# Patient Record
Sex: FEMALE | Race: WHITE | NOT HISPANIC OR LATINO | Employment: UNEMPLOYED | URBAN - METROPOLITAN AREA
[De-identification: names, ages, dates, MRNs, and addresses within clinical notes are randomized per-mention and may not be internally consistent; named-entity substitution may affect disease eponyms.]

---

## 2022-04-07 ENCOUNTER — TELEPHONE (OUTPATIENT)
Dept: SURGERY | Facility: CLINIC | Age: 37
End: 2022-04-07
Payer: MEDICAID

## 2022-04-07 NOTE — TELEPHONE ENCOUNTER
----- Message from Jazmin Lyon sent at 4/7/2022 11:25 AM CDT -----  Regarding: appt  Contact: pt @ [phone]  Pt is calling to make appt, stated that the dr sent over referral. Asking for a call back

## 2022-04-07 NOTE — TELEPHONE ENCOUNTER
Left message I have not received the referral. Gave her the fax# to have her doctor refax it to us. Once I get it I will call her to schedule an appt with Dr Wilburn.

## 2022-05-23 ENCOUNTER — OFFICE VISIT (OUTPATIENT)
Dept: SURGERY | Facility: CLINIC | Age: 37
End: 2022-05-23
Payer: MEDICAID

## 2022-05-23 VITALS — HEART RATE: 77 BPM | WEIGHT: 134.25 LBS | SYSTOLIC BLOOD PRESSURE: 100 MMHG | DIASTOLIC BLOOD PRESSURE: 62 MMHG

## 2022-05-23 DIAGNOSIS — K62.3 RECTAL PROLAPSE: Primary | ICD-10-CM

## 2022-05-23 DIAGNOSIS — R15.9 FULL INCONTINENCE OF FECES: ICD-10-CM

## 2022-05-23 PROCEDURE — 3078F PR MOST RECENT DIASTOLIC BLOOD PRESSURE < 80 MM HG: ICD-10-PCS | Mod: CPTII,,, | Performed by: COLON & RECTAL SURGERY

## 2022-05-23 PROCEDURE — 99213 OFFICE O/P EST LOW 20 MIN: CPT | Mod: PBBFAC,PN | Performed by: COLON & RECTAL SURGERY

## 2022-05-23 PROCEDURE — 1159F MED LIST DOCD IN RCRD: CPT | Mod: CPTII,,, | Performed by: COLON & RECTAL SURGERY

## 2022-05-23 PROCEDURE — 3074F PR MOST RECENT SYSTOLIC BLOOD PRESSURE < 130 MM HG: ICD-10-PCS | Mod: CPTII,,, | Performed by: COLON & RECTAL SURGERY

## 2022-05-23 PROCEDURE — 3078F DIAST BP <80 MM HG: CPT | Mod: CPTII,,, | Performed by: COLON & RECTAL SURGERY

## 2022-05-23 PROCEDURE — 99999 PR PBB SHADOW E&M-EST. PATIENT-LVL III: CPT | Mod: PBBFAC,,, | Performed by: COLON & RECTAL SURGERY

## 2022-05-23 PROCEDURE — 99999 PR PBB SHADOW E&M-EST. PATIENT-LVL III: ICD-10-PCS | Mod: PBBFAC,,, | Performed by: COLON & RECTAL SURGERY

## 2022-05-23 PROCEDURE — 99205 PR OFFICE/OUTPT VISIT, NEW, LEVL V, 60-74 MIN: ICD-10-PCS | Mod: S$PBB,,, | Performed by: COLON & RECTAL SURGERY

## 2022-05-23 PROCEDURE — 99205 OFFICE O/P NEW HI 60 MIN: CPT | Mod: S$PBB,,, | Performed by: COLON & RECTAL SURGERY

## 2022-05-23 PROCEDURE — 3074F SYST BP LT 130 MM HG: CPT | Mod: CPTII,,, | Performed by: COLON & RECTAL SURGERY

## 2022-05-23 PROCEDURE — 1159F PR MEDICATION LIST DOCUMENTED IN MEDICAL RECORD: ICD-10-PCS | Mod: CPTII,,, | Performed by: COLON & RECTAL SURGERY

## 2022-05-23 RX ORDER — OXCARBAZEPINE 150 MG/1
150 TABLET, FILM COATED ORAL 2 TIMES DAILY
COMMUNITY
Start: 2021-12-16

## 2022-05-23 RX ORDER — TRAZODONE HYDROCHLORIDE 100 MG/1
100 TABLET ORAL
COMMUNITY
Start: 2022-05-17

## 2022-05-23 RX ORDER — OXYCODONE AND ACETAMINOPHEN 10; 325 MG/1; MG/1
TABLET ORAL
COMMUNITY
Start: 2022-02-28

## 2022-05-23 RX ORDER — PANCRELIPASE 24000; 76000; 120000 [USP'U]/1; [USP'U]/1; [USP'U]/1
2 CAPSULE, DELAYED RELEASE PELLETS ORAL 3 TIMES DAILY
COMMUNITY
Start: 2022-04-19

## 2022-05-23 RX ORDER — TIZANIDINE 4 MG/1
4 TABLET ORAL
COMMUNITY
Start: 2022-05-17

## 2022-05-23 RX ORDER — CARIPRAZINE 3 MG/1
CAPSULE, GELATIN COATED ORAL
COMMUNITY
Start: 2022-05-17

## 2022-05-23 RX ORDER — INDOMETHACIN 50 MG/1
50 CAPSULE ORAL
COMMUNITY
Start: 2022-02-28 | End: 2022-12-01 | Stop reason: CLARIF

## 2022-05-23 RX ORDER — PREDNISONE 20 MG/1
20 TABLET ORAL
COMMUNITY
Start: 2022-02-28 | End: 2022-12-01 | Stop reason: CLARIF

## 2022-05-23 RX ORDER — PANTOPRAZOLE SODIUM 40 MG/1
40 TABLET, DELAYED RELEASE ORAL
COMMUNITY
Start: 2022-03-19

## 2022-05-23 NOTE — H&P (VIEW-ONLY)
CRS Office Visit History and Physical    Referring Md:   Cornelio Zheng Md  [address] [place] [address] [place] [id number]    SUBJECTIVE:     Chief Complaint: rectal prolapse    History of Present Illness:  Patient is a 37 y.o. female presents with recurrent rectal prolapse.     8-2017 robotic rectopexy   robotic resection rectopexy    Recurrent protrusion.   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  On Creon.  NOT on questran.  Sometimes has fecal incontinence.  Wears diaper.          History reviewed. No pertinent past medical history.    No past surgical history on file.    Review of patient's allergies indicates:   Allergen Reactions    Cephalosporins Anaphylaxis     Other reaction(s): Infection  Pt can take penicillin      Metoclopramide Other (See Comments)     Other reaction(s): Infection  Involuntary muscle spasms         Current Outpatient Medications on File Prior to Visit   Medication Sig Dispense Refill    cariprazine (VRAYLAR) 3 mg Cap   TAKE ONE CAPSULE BY MOUTH EVERY DAY THANK YOU      CREON 24,000-76,000 -120,000 unit capsule Take 2 capsules by mouth 3 (three) times daily.      indomethacin (INDOCIN) 50 MG capsule 50 mg.      OXcarbazepine (TRILEPTAL) 150 MG Tab Take 150 mg by mouth 2 (two) times daily.      oxyCODONE-acetaminophen (PERCOCET)  mg per tablet   1 tab, Oral, q6h, PRN for pain, 0 Refill(s)      pantoprazole (PROTONIX) 40 MG tablet 40 mg.      predniSONE (DELTASONE) 20 MG tablet 20 mg.      tiZANidine (ZANAFLEX) 4 MG tablet 4 mg.      traZODone (DESYREL) 100 MG tablet 100 mg.      UNABLE TO FIND 325 mg.       No current facility-administered medications on file prior to visit.       History reviewed. No pertinent family history.          Review of Systems:  ROS:  GENERAL: No fever, chills, fatigability or weight loss.  Integument:  No rashes, redness, icterus  CHEST: Denies ORTIZ, cyanosis, wheezing, cough and sputum production.  CARDIOVASCULAR: Denies chest  pain, PND, orthopnea or reduced exercise tolerance.  GI:  Denies abd pain, dysphagia, nausea, vomiting, no hematemesis, no rectal pain  : Denies burning on urination, no hematuria, no bacteriuria  MSK:  No deformities, swelling, joint pain swelling  Neurologic:  No HAs, seizures, weakness, paresthesias, gait problems      OBJECTIVE:     Vital Signs (Most Recent)  /62 (BP Location: Left arm, Patient Position: Sitting, BP Method: Large (Automatic))   Pulse 77   Wt 60.9 kg (134 lb 4.2 oz)     Physical Exam:  General: White female in no distress   Skin/ Sclera anicteric  HEENT: anicteric, normocephalic, extraocular movements intact   Neck trachea midline, thyroid not enlarged  Chest symmetric, nl excursions, no retractions  Respiratory: respirations are even and unlabored  COR RRR   Abdomen - inspection       Healed incisions - upper midline, Pfannenstiel  soft NT ND.  no masses, no  Organomegaly    Anorectal:   Inspection         RUY:  decreased tone, no masses, weak but present pelvic floor movement, little external sphincter squeeze, nl relaxation with Valsalva  Extremities: Warm dry and intact.  NO CCE  Neuro: alert and oriented x 4.  Moves all extremities.         ASSESSMENT/PLAN:   Recurrent rectal prolapse  Chronic diarrhea  Small umbilical hernia      Recommend  Begin questran continue Creon for chronic loose stools  Open rectopexy with mesh.  The details of procedure, need for mesh repair given multiple recurrences, possible constipation after surgery and possible continued fecal incontinence were discussed explicitly.  The risks related to chronic mesh including infection, erosion and need for removal of the mesh were discussed in detail.  Additional risks of bleeding, need for blood transfusion, infection, need for reoperation and possible recurrence of the prolapse were discussed.  Expected hospitalization and recuperation were explained.

## 2022-05-23 NOTE — PROGRESS NOTES
CRS Office Visit History and Physical    Referring Md:   Cornelio Zheng Md  [address] [place] [address] [place] [id number]    SUBJECTIVE:     Chief Complaint: rectal prolapse    History of Present Illness:  Patient is a 37 y.o. female presents with recurrent rectal prolapse.     8-2017 robotic rectopexy   robotic resection rectopexy    Recurrent protrusion.   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  On Creon.  NOT on questran.  Sometimes has fecal incontinence.  Wears diaper.          History reviewed. No pertinent past medical history.    No past surgical history on file.    Review of patient's allergies indicates:   Allergen Reactions    Cephalosporins Anaphylaxis     Other reaction(s): Infection  Pt can take penicillin      Metoclopramide Other (See Comments)     Other reaction(s): Infection  Involuntary muscle spasms         Current Outpatient Medications on File Prior to Visit   Medication Sig Dispense Refill    cariprazine (VRAYLAR) 3 mg Cap   TAKE ONE CAPSULE BY MOUTH EVERY DAY THANK YOU      CREON 24,000-76,000 -120,000 unit capsule Take 2 capsules by mouth 3 (three) times daily.      indomethacin (INDOCIN) 50 MG capsule 50 mg.      OXcarbazepine (TRILEPTAL) 150 MG Tab Take 150 mg by mouth 2 (two) times daily.      oxyCODONE-acetaminophen (PERCOCET)  mg per tablet   1 tab, Oral, q6h, PRN for pain, 0 Refill(s)      pantoprazole (PROTONIX) 40 MG tablet 40 mg.      predniSONE (DELTASONE) 20 MG tablet 20 mg.      tiZANidine (ZANAFLEX) 4 MG tablet 4 mg.      traZODone (DESYREL) 100 MG tablet 100 mg.      UNABLE TO FIND 325 mg.       No current facility-administered medications on file prior to visit.       History reviewed. No pertinent family history.          Review of Systems:  ROS:  GENERAL: No fever, chills, fatigability or weight loss.  Integument:  No rashes, redness, icterus  CHEST: Denies ORTIZ, cyanosis, wheezing, cough and sputum production.  CARDIOVASCULAR: Denies chest  pain, PND, orthopnea or reduced exercise tolerance.  GI:  Denies abd pain, dysphagia, nausea, vomiting, no hematemesis, no rectal pain  : Denies burning on urination, no hematuria, no bacteriuria  MSK:  No deformities, swelling, joint pain swelling  Neurologic:  No HAs, seizures, weakness, paresthesias, gait problems      OBJECTIVE:     Vital Signs (Most Recent)  /62 (BP Location: Left arm, Patient Position: Sitting, BP Method: Large (Automatic))   Pulse 77   Wt 60.9 kg (134 lb 4.2 oz)     Physical Exam:  General: White female in no distress   Skin/ Sclera anicteric  HEENT: anicteric, normocephalic, extraocular movements intact   Neck trachea midline, thyroid not enlarged  Chest symmetric, nl excursions, no retractions  Respiratory: respirations are even and unlabored  COR RRR   Abdomen - inspection       Healed incisions - upper midline, Pfannenstiel  soft NT ND.  no masses, no  Organomegaly    Anorectal:   Inspection         RUY:  decreased tone, no masses, weak but present pelvic floor movement, little external sphincter squeeze, nl relaxation with Valsalva  Extremities: Warm dry and intact.  NO CCE  Neuro: alert and oriented x 4.  Moves all extremities.         ASSESSMENT/PLAN:   Recurrent rectal prolapse  Chronic diarrhea  Small umbilical hernia      Recommend  Begin questran continue Creon for chronic loose stools  Open rectopexy with mesh.  The details of procedure, need for mesh repair given multiple recurrences, possible constipation after surgery and possible continued fecal incontinence were discussed explicitly.  The risks related to chronic mesh including infection, erosion and need for removal of the mesh were discussed in detail.  Additional risks of bleeding, need for blood transfusion, infection, need for reoperation and possible recurrence of the prolapse were discussed.  Expected hospitalization and recuperation were explained.

## 2022-05-26 DIAGNOSIS — K62.3 RECTAL PROLAPSE: Primary | ICD-10-CM

## 2022-06-13 ENCOUNTER — TELEPHONE (OUTPATIENT)
Dept: SURGERY | Facility: CLINIC | Age: 37
End: 2022-06-13
Payer: MEDICAID

## 2022-06-13 DIAGNOSIS — Z01.818 PREOP TESTING: Primary | ICD-10-CM

## 2022-06-13 RX ORDER — METRONIDAZOLE 500 MG/1
500 TABLET ORAL 3 TIMES DAILY
Qty: 3 TABLET | Refills: 0 | Status: SHIPPED | OUTPATIENT
Start: 2022-06-13

## 2022-06-13 RX ORDER — POLYETHYLENE GLYCOL 3350, SODIUM SULFATE ANHYDROUS, SODIUM BICARBONATE, SODIUM CHLORIDE, POTASSIUM CHLORIDE 236; 22.74; 6.74; 5.86; 2.97 G/4L; G/4L; G/4L; G/4L; G/4L
4 POWDER, FOR SOLUTION ORAL ONCE
Qty: 4000 ML | Refills: 0 | Status: SHIPPED | OUTPATIENT
Start: 2022-06-13 | End: 2022-06-13

## 2022-06-13 RX ORDER — NEOMYCIN SULFATE 500 MG/1
TABLET ORAL
Qty: 6 TABLET | Refills: 0 | Status: SHIPPED | OUTPATIENT
Start: 2022-06-13 | End: 2022-12-01 | Stop reason: CLARIF

## 2022-06-13 NOTE — TELEPHONE ENCOUNTER
----- Message from Paige Arcenio sent at 6/13/2022 11:37 AM CDT -----  Regarding: pt advice  Contact: pt @ [phone]  Pt calling to speak with someone in Dr. Wilburn' office regarding getting the orders sent over for her covid test, and needs to discuss medication that the doctor was to have prescribed for her. Please call.    [facility], [facility] - [place], [place] - [address]  [address] [facility] [address]  Phone: [phone] Fax: [phone]

## 2022-06-15 ENCOUNTER — TELEPHONE (OUTPATIENT)
Dept: SURGERY | Facility: CLINIC | Age: 37
End: 2022-06-15
Payer: MEDICAID

## 2022-06-15 NOTE — TELEPHONE ENCOUNTER
----- Message from Aimee Cheung sent at 6/15/2022  1:11 PM CDT -----  Contact: [facility]    Kelly from [facility] pharmacy advising office that RX below is on back order and she requesting a new script for Hunter bowel kit    Go Lightly    Confirmed contact below:  Contact Name:Kelly  Phone Number: [phone]

## 2022-06-16 ENCOUNTER — ANESTHESIA EVENT (OUTPATIENT)
Dept: SURGERY | Facility: HOSPITAL | Age: 37
End: 2022-06-16
Payer: MEDICAID

## 2022-06-16 NOTE — ANESTHESIA PREPROCEDURE EVALUATION
[facility]  Anesthesia Pre-Operative Evaluation         Patient Name/: Sheila Ortez, 1985  MRN: [id number]    SUBJECTIVE:     Pre-operative evaluation for Procedure(s) (LRB):  RECTOPEXY, LAPAROSCOPIC with Mesh (N/A)     2022    Sheila Ortez is a 37 y.o. female w/ a significant PMHx of anemia, current smoker, anxiety, dumping syndrome, fibromyalgia, GERD, levoscoliosis, chronic opioid user (fills 90 Norco 10 monthly per PDMP), and recurrent rectal prolapse s/p 2 prior rectopexy.     Patient now presents for the above procedure(s).    Prev airway: None documented.      There is no problem list on file for this patient.      Review of patient's allergies indicates:   Allergen Reactions    Cephalosporins Anaphylaxis     Other reaction(s): Infection  Pt can take penicillin      Metoclopramide Other (See Comments)     Other reaction(s): Infection  Involuntary muscle spasms         Current Inpatient Medications:       No current facility-administered medications on file prior to encounter.     Current Outpatient Medications on File Prior to Encounter   Medication Sig Dispense Refill    cariprazine (VRAYLAR) 3 mg Cap   TAKE ONE CAPSULE BY MOUTH EVERY DAY THANK YOU      CREON 24,000-76,000 -120,000 unit capsule Take 2 capsules by mouth 3 (three) times daily.      indomethacin (INDOCIN) 50 MG capsule 50 mg.      OXcarbazepine (TRILEPTAL) 150 MG Tab Take 150 mg by mouth 2 (two) times daily.      oxyCODONE-acetaminophen (PERCOCET)  mg per tablet   1 tab, Oral, q6h, PRN for pain, 0 Refill(s)      pantoprazole (PROTONIX) 40 MG tablet 40 mg.      predniSONE (DELTASONE) 20 MG tablet 20 mg.      tiZANidine (ZANAFLEX) 4 MG tablet 4 mg.      traZODone (DESYREL) 100 MG tablet 100 mg.      UNABLE TO FIND 325 mg.         Past Surgical History:   Procedure Laterality Date    ABDOMINAL SURGERY      COLON SURGERY         Social History:  Tobacco Use: High Risk    Smoking Tobacco Use:  Current Every Day Smoker    Smokeless Tobacco Use: Unknown       Alcohol Use: Not on file       OBJECTIVE:     Vital Signs Range:  BMI Readings from Last 1 Encounters:   No data found for BMI               Significant Labs:        Component Value Date/Time    WBC 10.8 (H) 05/17/2022 1057    HGB 8.9 (L) 05/17/2022 1057    HCT 29.3 (L) 05/17/2022 1057     (H) 05/17/2022 1057     03/19/2022 0448    K 4.0 03/19/2022 0448     (H) 03/19/2022 0448    CO2 26 03/19/2022 0448    BUN 7 (L) 03/19/2022 0448    CREATININE 0.41 (L) 03/19/2022 0448    CALCIUM 8.0 (L) 03/19/2022 0448    ALBUMIN 3.2 03/17/2022 1450    ALKPHOS 124 (H) 03/17/2022 1450    AST 14 03/17/2022 1450    ALT 41 03/17/2022 1450        Please see Results Review for additional labs.     Diagnostic Studies: No relevant studies.    EKG:   No results found for this or any previous visit.    ECHO:  No results found for this or any previous visit.        ASSESSMENT/PLAN:       Pre-op Assessment    I have reviewed the Patient Summary Reports.     I have reviewed the Nursing Notes.    I have reviewed the Medications.     Review of Systems  Social:  Smoker    EENT/Dental:EENT/Dental Normal   Cardiovascular:  Cardiovascular Normal     Pulmonary:  Pulmonary Normal    Renal/:  Renal/ Normal     Hepatic/GI:   GERD    Neurological:  Neurology Normal    Psych:   Psychiatric History anxiety          Physical Exam  General: Well nourished, Cooperative, Alert and Oriented    Airway:  Mallampati: II / I  Mouth Opening: Normal  Neck ROM: Normal ROM    Dental:  Intact    Chest/Lungs:  Clear to auscultation, Normal Respiratory Rate    Heart:  Rate: Normal  Rhythm: Regular Rhythm  Sounds: Normal        Anesthesia Plan  Type of Anesthesia, risks & benefits discussed:    Anesthesia Type: Gen ETT  Intra-op Monitoring Plan: Standard ASA Monitors  Post Op Pain Control Plan: multimodal analgesia and IV/PO Opioids PRN  Induction:  IV  Airway Plan: Direct,  Post-Induction  Informed Consent: Informed consent signed with the Patient and all parties understand the risks and agree with anesthesia plan.  All questions answered.   ASA Score: 2  Day of Surgery Review of History & Physical: H&P Update referred to the surgeon/provider.  Anesthesia Plan Notes: Per PDMP review, patient does regularly fill 90 Norco 10s every month.     Ready For Surgery From Anesthesia Perspective.     .

## 2022-06-17 ENCOUNTER — HOSPITAL ENCOUNTER (INPATIENT)
Facility: HOSPITAL | Age: 37
LOS: 1 days | Discharge: HOME | End: 2022-06-18
Attending: COLON & RECTAL SURGERY | Admitting: COLON & RECTAL SURGERY
Payer: MEDICAID

## 2022-06-17 ENCOUNTER — ANESTHESIA (OUTPATIENT)
Dept: SURGERY | Facility: HOSPITAL | Age: 37
End: 2022-06-17
Payer: MEDICAID

## 2022-06-17 DIAGNOSIS — K62.3 RECTAL PROLAPSE: ICD-10-CM

## 2022-06-17 LAB
ABO + RH BLD: NORMAL
ANISOCYTOSIS BLD QL SMEAR: SLIGHT
B-HCG UR QL: NEGATIVE
BASOPHILS # BLD AUTO: 0.04 K/UL (ref 0–0.2)
BASOPHILS NFR BLD: 0.2 % (ref 0–1.9)
BLD GP AB SCN CELLS X3 SERPL QL: NORMAL
BURR CELLS BLD QL SMEAR: ABNORMAL
CTP QC/QA: YES
DIFFERENTIAL METHOD: ABNORMAL
EOSINOPHIL # BLD AUTO: 3.2 K/UL (ref 0–0.5)
EOSINOPHIL NFR BLD: 14 % (ref 0–8)
ERYTHROCYTE [DISTWIDTH] IN BLOOD BY AUTOMATED COUNT: 18.1 % (ref 11.5–14.5)
HCT VFR BLD AUTO: 30.8 % (ref 37–48.5)
HGB BLD-MCNC: 9 G/DL (ref 12–16)
HYPOCHROMIA BLD QL SMEAR: ABNORMAL
IMM GRANULOCYTES # BLD AUTO: 0.12 K/UL (ref 0–0.04)
IMM GRANULOCYTES NFR BLD AUTO: 0.5 % (ref 0–0.5)
LYMPHOCYTES # BLD AUTO: 0.8 K/UL (ref 1–4.8)
LYMPHOCYTES NFR BLD: 3.4 % (ref 18–48)
MCH RBC QN AUTO: 24.5 PG (ref 27–31)
MCHC RBC AUTO-ENTMCNC: 29.2 G/DL (ref 32–36)
MCV RBC AUTO: 84 FL (ref 82–98)
MONOCYTES # BLD AUTO: 1.2 K/UL (ref 0.3–1)
MONOCYTES NFR BLD: 5 % (ref 4–15)
NEUTROPHILS # BLD AUTO: 17.9 K/UL (ref 1.8–7.7)
NEUTROPHILS NFR BLD: 76.9 % (ref 38–73)
NRBC BLD-RTO: 0 /100 WBC
PLATELET # BLD AUTO: 595 K/UL (ref 150–450)
PLATELET BLD QL SMEAR: ABNORMAL
PMV BLD AUTO: 8.9 FL (ref 9.2–12.9)
POIKILOCYTOSIS BLD QL SMEAR: SLIGHT
POLYCHROMASIA BLD QL SMEAR: ABNORMAL
RBC # BLD AUTO: 3.67 M/UL (ref 4–5.4)
WBC # BLD AUTO: 23.19 K/UL (ref 3.9–12.7)

## 2022-06-17 PROCEDURE — 36000708 HC OR TIME LEV III 1ST 15 MIN: Performed by: COLON & RECTAL SURGERY

## 2022-06-17 PROCEDURE — 63600175 PHARM REV CODE 636 W HCPCS: Performed by: STUDENT IN AN ORGANIZED HEALTH CARE EDUCATION/TRAINING PROGRAM

## 2022-06-17 PROCEDURE — 20600001 HC STEP DOWN PRIVATE ROOM

## 2022-06-17 PROCEDURE — 94761 N-INVAS EAR/PLS OXIMETRY MLT: CPT

## 2022-06-17 PROCEDURE — 86901 BLOOD TYPING SEROLOGIC RH(D): CPT | Performed by: NURSE PRACTITIONER

## 2022-06-17 PROCEDURE — D9220A PRA ANESTHESIA: Mod: GC,,, | Performed by: ANESTHESIOLOGY

## 2022-06-17 PROCEDURE — 81025 URINE PREGNANCY TEST: CPT | Performed by: COLON & RECTAL SURGERY

## 2022-06-17 PROCEDURE — 25000003 PHARM REV CODE 250: Performed by: NURSE PRACTITIONER

## 2022-06-17 PROCEDURE — 52005 PR CYSTOURETHROSCOPY,URETER CATHETER: ICD-10-PCS | Mod: ,,, | Performed by: UROLOGY

## 2022-06-17 PROCEDURE — 71000015 HC POSTOP RECOV 1ST HR: Performed by: COLON & RECTAL SURGERY

## 2022-06-17 PROCEDURE — 27201423 OPTIME MED/SURG SUP & DEVICES STERILE SUPPLY: Performed by: COLON & RECTAL SURGERY

## 2022-06-17 PROCEDURE — 52005 CYSTO W/URTRL CATHJ: CPT | Mod: ,,, | Performed by: UROLOGY

## 2022-06-17 PROCEDURE — 45540 CORRECT RECTAL PROLAPSE: CPT | Mod: ,,, | Performed by: COLON & RECTAL SURGERY

## 2022-06-17 PROCEDURE — D9220A PRA ANESTHESIA: ICD-10-PCS | Mod: GC,,, | Performed by: ANESTHESIOLOGY

## 2022-06-17 PROCEDURE — 37000008 HC ANESTHESIA 1ST 15 MINUTES: Performed by: COLON & RECTAL SURGERY

## 2022-06-17 PROCEDURE — 45540 PR FIX RECTAL PROLAPSE,ABD APPRCH: ICD-10-PCS | Mod: ,,, | Performed by: COLON & RECTAL SURGERY

## 2022-06-17 PROCEDURE — 25000003 PHARM REV CODE 250: Performed by: COLON & RECTAL SURGERY

## 2022-06-17 PROCEDURE — 63600175 PHARM REV CODE 636 W HCPCS: Performed by: NURSE PRACTITIONER

## 2022-06-17 PROCEDURE — 63600175 PHARM REV CODE 636 W HCPCS: Performed by: SURGERY

## 2022-06-17 PROCEDURE — 99900035 HC TECH TIME PER 15 MIN (STAT)

## 2022-06-17 PROCEDURE — 25000003 PHARM REV CODE 250: Performed by: SURGERY

## 2022-06-17 PROCEDURE — 71000016 HC POSTOP RECOV ADDL HR: Performed by: COLON & RECTAL SURGERY

## 2022-06-17 PROCEDURE — 85025 COMPLETE CBC W/AUTO DIFF WBC: CPT | Performed by: ANESTHESIOLOGY

## 2022-06-17 PROCEDURE — 63600175 PHARM REV CODE 636 W HCPCS

## 2022-06-17 PROCEDURE — C1758 CATHETER, URETERAL: HCPCS | Performed by: COLON & RECTAL SURGERY

## 2022-06-17 PROCEDURE — S0030 INJECTION, METRONIDAZOLE: HCPCS | Performed by: NURSE PRACTITIONER

## 2022-06-17 PROCEDURE — 25000003 PHARM REV CODE 250: Performed by: STUDENT IN AN ORGANIZED HEALTH CARE EDUCATION/TRAINING PROGRAM

## 2022-06-17 PROCEDURE — 71000033 HC RECOVERY, INTIAL HOUR: Performed by: COLON & RECTAL SURGERY

## 2022-06-17 PROCEDURE — 36000709 HC OR TIME LEV III EA ADD 15 MIN: Performed by: COLON & RECTAL SURGERY

## 2022-06-17 PROCEDURE — 37000009 HC ANESTHESIA EA ADD 15 MINS: Performed by: COLON & RECTAL SURGERY

## 2022-06-17 PROCEDURE — 36415 COLL VENOUS BLD VENIPUNCTURE: CPT | Performed by: COLON & RECTAL SURGERY

## 2022-06-17 RX ORDER — METRONIDAZOLE 500 MG/100ML
500 INJECTION, SOLUTION INTRAVENOUS
Status: COMPLETED | OUTPATIENT
Start: 2022-06-17 | End: 2022-06-17

## 2022-06-17 RX ORDER — ACETAMINOPHEN 500 MG
1000 TABLET ORAL EVERY 8 HOURS
Status: DISCONTINUED | OUTPATIENT
Start: 2022-06-18 | End: 2022-06-18 | Stop reason: HOSPADM

## 2022-06-17 RX ORDER — ACETAMINOPHEN 650 MG/20.3ML
975 LIQUID ORAL
Status: COMPLETED | OUTPATIENT
Start: 2022-06-17 | End: 2022-06-17

## 2022-06-17 RX ORDER — SODIUM CHLORIDE 9 MG/ML
INJECTION, SOLUTION INTRAVENOUS CONTINUOUS
Status: DISCONTINUED | OUTPATIENT
Start: 2022-06-17 | End: 2022-06-18 | Stop reason: HOSPADM

## 2022-06-17 RX ORDER — OXYCODONE HYDROCHLORIDE 10 MG/1
10 TABLET ORAL EVERY 4 HOURS PRN
Status: DISCONTINUED | OUTPATIENT
Start: 2022-06-17 | End: 2022-06-18 | Stop reason: HOSPADM

## 2022-06-17 RX ORDER — INDOMETHACIN 50 MG/1
50 CAPSULE ORAL DAILY
Status: DISCONTINUED | OUTPATIENT
Start: 2022-06-18 | End: 2022-06-17

## 2022-06-17 RX ORDER — HYDROMORPHONE HYDROCHLORIDE 1 MG/ML
0.25 INJECTION, SOLUTION INTRAMUSCULAR; INTRAVENOUS; SUBCUTANEOUS EVERY 4 HOURS PRN
Status: DISCONTINUED | OUTPATIENT
Start: 2022-06-17 | End: 2022-06-18 | Stop reason: HOSPADM

## 2022-06-17 RX ORDER — MUPIROCIN 20 MG/G
OINTMENT TOPICAL 2 TIMES DAILY
Status: DISCONTINUED | OUTPATIENT
Start: 2022-06-17 | End: 2022-06-18 | Stop reason: HOSPADM

## 2022-06-17 RX ORDER — HYDROMORPHONE HYDROCHLORIDE 1 MG/ML
INJECTION, SOLUTION INTRAMUSCULAR; INTRAVENOUS; SUBCUTANEOUS
Status: COMPLETED
Start: 2022-06-17 | End: 2022-06-17

## 2022-06-17 RX ORDER — FENTANYL CITRATE 50 UG/ML
INJECTION, SOLUTION INTRAMUSCULAR; INTRAVENOUS
Status: DISCONTINUED | OUTPATIENT
Start: 2022-06-17 | End: 2022-06-17

## 2022-06-17 RX ORDER — ENOXAPARIN SODIUM 100 MG/ML
40 INJECTION SUBCUTANEOUS EVERY 24 HOURS
Status: DISCONTINUED | OUTPATIENT
Start: 2022-06-18 | End: 2022-06-18 | Stop reason: HOSPADM

## 2022-06-17 RX ORDER — TRIPROLIDINE/PSEUDOEPHEDRINE 2.5MG-60MG
600 TABLET ORAL
Status: COMPLETED | OUTPATIENT
Start: 2022-06-17 | End: 2022-06-17

## 2022-06-17 RX ORDER — KETAMINE HCL IN 0.9 % NACL 50 MG/5 ML
SYRINGE (ML) INTRAVENOUS
Status: DISCONTINUED | OUTPATIENT
Start: 2022-06-17 | End: 2022-06-17

## 2022-06-17 RX ORDER — MIDAZOLAM HYDROCHLORIDE 1 MG/ML
INJECTION, SOLUTION INTRAMUSCULAR; INTRAVENOUS
Status: DISCONTINUED | OUTPATIENT
Start: 2022-06-17 | End: 2022-06-17

## 2022-06-17 RX ORDER — HEPARIN SODIUM 5000 [USP'U]/ML
5000 INJECTION, SOLUTION INTRAVENOUS; SUBCUTANEOUS EVERY 8 HOURS
Status: COMPLETED | OUTPATIENT
Start: 2022-06-17 | End: 2022-06-17

## 2022-06-17 RX ORDER — PREDNISONE 20 MG/1
20 TABLET ORAL DAILY
Status: DISCONTINUED | OUTPATIENT
Start: 2022-06-17 | End: 2022-06-18 | Stop reason: HOSPADM

## 2022-06-17 RX ORDER — MUPIROCIN 20 MG/G
1 OINTMENT TOPICAL
Status: COMPLETED | OUTPATIENT
Start: 2022-06-17 | End: 2022-06-17

## 2022-06-17 RX ORDER — GABAPENTIN 300 MG/1
300 CAPSULE ORAL 3 TIMES DAILY
Status: DISCONTINUED | OUTPATIENT
Start: 2022-06-17 | End: 2022-06-18 | Stop reason: HOSPADM

## 2022-06-17 RX ORDER — SODIUM CHLORIDE 9 MG/ML
INJECTION, SOLUTION INTRAVENOUS
Status: COMPLETED | OUTPATIENT
Start: 2022-06-17 | End: 2022-06-17

## 2022-06-17 RX ORDER — IBUPROFEN 400 MG/1
800 TABLET ORAL EVERY 8 HOURS
Status: DISCONTINUED | OUTPATIENT
Start: 2022-06-18 | End: 2022-06-18 | Stop reason: HOSPADM

## 2022-06-17 RX ORDER — HYDROMORPHONE HYDROCHLORIDE 1 MG/ML
0.2 INJECTION, SOLUTION INTRAMUSCULAR; INTRAVENOUS; SUBCUTANEOUS EVERY 5 MIN PRN
Status: COMPLETED | OUTPATIENT
Start: 2022-06-17 | End: 2022-06-17

## 2022-06-17 RX ORDER — OXYCODONE HYDROCHLORIDE 5 MG/1
5 TABLET ORAL EVERY 4 HOURS PRN
Status: DISCONTINUED | OUTPATIENT
Start: 2022-06-17 | End: 2022-06-18 | Stop reason: HOSPADM

## 2022-06-17 RX ORDER — GABAPENTIN 300 MG/1
300 CAPSULE ORAL 3 TIMES DAILY
Status: DISCONTINUED | OUTPATIENT
Start: 2022-06-17 | End: 2022-06-18 | Stop reason: SDUPTHER

## 2022-06-17 RX ORDER — GABAPENTIN 300 MG/1
300 CAPSULE ORAL
Status: COMPLETED | OUTPATIENT
Start: 2022-06-17 | End: 2022-06-17

## 2022-06-17 RX ORDER — ONDANSETRON 2 MG/ML
INJECTION INTRAMUSCULAR; INTRAVENOUS
Status: DISCONTINUED | OUTPATIENT
Start: 2022-06-17 | End: 2022-06-17

## 2022-06-17 RX ORDER — SODIUM CHLORIDE 0.9 % (FLUSH) 0.9 %
10 SYRINGE (ML) INJECTION
Status: DISCONTINUED | OUTPATIENT
Start: 2022-06-17 | End: 2022-06-18 | Stop reason: HOSPADM

## 2022-06-17 RX ORDER — ROCURONIUM BROMIDE 10 MG/ML
INJECTION, SOLUTION INTRAVENOUS
Status: DISCONTINUED | OUTPATIENT
Start: 2022-06-17 | End: 2022-06-17

## 2022-06-17 RX ORDER — HALOPERIDOL 5 MG/ML
0.5 INJECTION INTRAMUSCULAR EVERY 10 MIN PRN
Status: DISCONTINUED | OUTPATIENT
Start: 2022-06-17 | End: 2022-06-17 | Stop reason: HOSPADM

## 2022-06-17 RX ORDER — PANTOPRAZOLE SODIUM 40 MG/1
40 TABLET, DELAYED RELEASE ORAL DAILY
Status: DISCONTINUED | OUTPATIENT
Start: 2022-06-17 | End: 2022-06-18 | Stop reason: HOSPADM

## 2022-06-17 RX ORDER — TRAZODONE HYDROCHLORIDE 100 MG/1
100 TABLET ORAL NIGHTLY
Status: DISCONTINUED | OUTPATIENT
Start: 2022-06-17 | End: 2022-06-18 | Stop reason: HOSPADM

## 2022-06-17 RX ORDER — DEXAMETHASONE SODIUM PHOSPHATE 4 MG/ML
INJECTION, SOLUTION INTRA-ARTICULAR; INTRALESIONAL; INTRAMUSCULAR; INTRAVENOUS; SOFT TISSUE
Status: DISCONTINUED | OUTPATIENT
Start: 2022-06-17 | End: 2022-06-17

## 2022-06-17 RX ORDER — HYDROMORPHONE HYDROCHLORIDE 1 MG/ML
0.2 INJECTION, SOLUTION INTRAMUSCULAR; INTRAVENOUS; SUBCUTANEOUS EVERY 5 MIN PRN
Status: DISCONTINUED | OUTPATIENT
Start: 2022-06-17 | End: 2022-06-17 | Stop reason: HOSPADM

## 2022-06-17 RX ORDER — NEOSTIGMINE METHYLSULFATE 0.5 MG/ML
INJECTION, SOLUTION INTRAVENOUS
Status: DISCONTINUED | OUTPATIENT
Start: 2022-06-17 | End: 2022-06-17

## 2022-06-17 RX ORDER — BUPIVACAINE HYDROCHLORIDE 2.5 MG/ML
INJECTION, SOLUTION EPIDURAL; INFILTRATION; INTRACAUDAL
Status: DISCONTINUED | OUTPATIENT
Start: 2022-06-17 | End: 2022-06-17 | Stop reason: HOSPADM

## 2022-06-17 RX ORDER — SODIUM CHLORIDE 0.9 % (FLUSH) 0.9 %
10 SYRINGE (ML) INJECTION
Status: DISCONTINUED | OUTPATIENT
Start: 2022-06-17 | End: 2022-06-17 | Stop reason: HOSPADM

## 2022-06-17 RX ORDER — PHENYLEPHRINE HCL IN 0.9% NACL 1 MG/10 ML
SYRINGE (ML) INTRAVENOUS
Status: DISCONTINUED | OUTPATIENT
Start: 2022-06-17 | End: 2022-06-17

## 2022-06-17 RX ORDER — LIDOCAINE HYDROCHLORIDE 20 MG/ML
INJECTION, SOLUTION EPIDURAL; INFILTRATION; INTRACAUDAL; PERINEURAL
Status: DISCONTINUED | OUTPATIENT
Start: 2022-06-17 | End: 2022-06-17

## 2022-06-17 RX ORDER — OXCARBAZEPINE 150 MG/1
150 TABLET, FILM COATED ORAL 2 TIMES DAILY
Status: DISCONTINUED | OUTPATIENT
Start: 2022-06-17 | End: 2022-06-18 | Stop reason: HOSPADM

## 2022-06-17 RX ORDER — LIDOCAINE HYDROCHLORIDE 10 MG/ML
1 INJECTION, SOLUTION EPIDURAL; INFILTRATION; INTRACAUDAL; PERINEURAL
Status: COMPLETED | OUTPATIENT
Start: 2022-06-17 | End: 2022-06-17

## 2022-06-17 RX ORDER — ONDANSETRON 2 MG/ML
4 INJECTION INTRAMUSCULAR; INTRAVENOUS EVERY 8 HOURS PRN
Status: DISCONTINUED | OUTPATIENT
Start: 2022-06-17 | End: 2022-06-18 | Stop reason: HOSPADM

## 2022-06-17 RX ORDER — ACETAMINOPHEN 10 MG/ML
1000 INJECTION, SOLUTION INTRAVENOUS EVERY 8 HOURS
Status: COMPLETED | OUTPATIENT
Start: 2022-06-17 | End: 2022-06-18

## 2022-06-17 RX ORDER — PROPOFOL 10 MG/ML
VIAL (ML) INTRAVENOUS
Status: DISCONTINUED | OUTPATIENT
Start: 2022-06-17 | End: 2022-06-17

## 2022-06-17 RX ORDER — SODIUM CHLORIDE 9 MG/ML
INJECTION, SOLUTION INTRAVENOUS CONTINUOUS
Status: ACTIVE | OUTPATIENT
Start: 2022-06-17

## 2022-06-17 RX ADMIN — OXYCODONE HYDROCHLORIDE 10 MG: 10 TABLET ORAL at 11:06

## 2022-06-17 RX ADMIN — HYDROMORPHONE HYDROCHLORIDE 0.2 MG: 1 INJECTION, SOLUTION INTRAMUSCULAR; INTRAVENOUS; SUBCUTANEOUS at 10:06

## 2022-06-17 RX ADMIN — OXCARBAZEPINE 150 MG: 150 TABLET, FILM COATED ORAL at 02:06

## 2022-06-17 RX ADMIN — LIDOCAINE HYDROCHLORIDE 0.2 MG: 10 INJECTION, SOLUTION EPIDURAL; INFILTRATION; INTRACAUDAL at 06:06

## 2022-06-17 RX ADMIN — HYDROMORPHONE HYDROCHLORIDE 0.2 MG: 1 INJECTION, SOLUTION INTRAMUSCULAR; INTRAVENOUS; SUBCUTANEOUS at 11:06

## 2022-06-17 RX ADMIN — Medication 100 MCG: at 08:06

## 2022-06-17 RX ADMIN — ACETAMINOPHEN 1000 MG: 10 INJECTION INTRAVENOUS at 02:06

## 2022-06-17 RX ADMIN — GABAPENTIN 300 MG: 300 CAPSULE ORAL at 04:06

## 2022-06-17 RX ADMIN — GABAPENTIN 300 MG: 300 CAPSULE ORAL at 09:06

## 2022-06-17 RX ADMIN — SODIUM CHLORIDE, SODIUM GLUCONATE, SODIUM ACETATE, POTASSIUM CHLORIDE, MAGNESIUM CHLORIDE, SODIUM PHOSPHATE, DIBASIC, AND POTASSIUM PHOSPHATE: .53; .5; .37; .037; .03; .012; .00082 INJECTION, SOLUTION INTRAVENOUS at 07:06

## 2022-06-17 RX ADMIN — SODIUM CHLORIDE: 0.9 INJECTION, SOLUTION INTRAVENOUS at 10:06

## 2022-06-17 RX ADMIN — FENTANYL CITRATE 100 MCG: 50 INJECTION INTRAMUSCULAR; INTRAVENOUS at 07:06

## 2022-06-17 RX ADMIN — Medication 20 MG: at 07:06

## 2022-06-17 RX ADMIN — SODIUM CHLORIDE: 0.9 INJECTION, SOLUTION INTRAVENOUS at 06:06

## 2022-06-17 RX ADMIN — OXYCODONE HYDROCHLORIDE 10 MG: 10 TABLET ORAL at 10:06

## 2022-06-17 RX ADMIN — IBUPROFEN 800 MG: 800 INJECTION INTRAVENOUS at 11:06

## 2022-06-17 RX ADMIN — OXYCODONE HYDROCHLORIDE 10 MG: 10 TABLET ORAL at 04:06

## 2022-06-17 RX ADMIN — OXCARBAZEPINE 150 MG: 150 TABLET, FILM COATED ORAL at 09:06

## 2022-06-17 RX ADMIN — TRAZODONE HYDROCHLORIDE 100 MG: 100 TABLET ORAL at 09:06

## 2022-06-17 RX ADMIN — GENTAMICIN SULFATE 293 MG: 40 INJECTION, SOLUTION INTRAMUSCULAR; INTRAVENOUS at 07:06

## 2022-06-17 RX ADMIN — ROCURONIUM BROMIDE 10 MG: 10 INJECTION INTRAVENOUS at 09:06

## 2022-06-17 RX ADMIN — ACETAMINOPHEN 1000 MG: 10 INJECTION INTRAVENOUS at 09:06

## 2022-06-17 RX ADMIN — HEPARIN SODIUM 5000 UNITS: 5000 INJECTION INTRAVENOUS; SUBCUTANEOUS at 06:06

## 2022-06-17 RX ADMIN — PROPOFOL 120 MG: 10 INJECTION, EMULSION INTRAVENOUS at 07:06

## 2022-06-17 RX ADMIN — IBUPROFEN 600 MG: 100 SUSPENSION ORAL at 06:06

## 2022-06-17 RX ADMIN — MUPIROCIN: 20 OINTMENT TOPICAL at 09:06

## 2022-06-17 RX ADMIN — MIDAZOLAM 2 MG: 1 INJECTION INTRAMUSCULAR; INTRAVENOUS at 07:06

## 2022-06-17 RX ADMIN — NEOSTIGMINE METHYLSULFATE 4 MG: 0.5 INJECTION INTRAVENOUS at 09:06

## 2022-06-17 RX ADMIN — FENTANYL CITRATE 25 MCG: 50 INJECTION INTRAMUSCULAR; INTRAVENOUS at 08:06

## 2022-06-17 RX ADMIN — ACETAMINOPHEN 976.6 MG: 160 SOLUTION ORAL at 06:06

## 2022-06-17 RX ADMIN — IBUPROFEN 800 MG: 800 INJECTION INTRAVENOUS at 05:06

## 2022-06-17 RX ADMIN — PREDNISONE 20 MG: 5 TABLET ORAL at 10:06

## 2022-06-17 RX ADMIN — SODIUM CHLORIDE: 0.9 INJECTION, SOLUTION INTRAVENOUS at 07:06

## 2022-06-17 RX ADMIN — LIDOCAINE HYDROCHLORIDE 60 MG: 20 INJECTION, SOLUTION EPIDURAL; INFILTRATION; INTRACAUDAL at 07:06

## 2022-06-17 RX ADMIN — DEXAMETHASONE SODIUM PHOSPHATE 4 MG: 4 INJECTION INTRA-ARTICULAR; INTRALESIONAL; INTRAMUSCULAR; INTRAVENOUS; SOFT TISSUE at 07:06

## 2022-06-17 RX ADMIN — METRONIDAZOLE 500 MG: 500 INJECTION, SOLUTION INTRAVENOUS at 07:06

## 2022-06-17 RX ADMIN — MUPIROCIN 1 G: 20 OINTMENT TOPICAL at 06:06

## 2022-06-17 RX ADMIN — ROCURONIUM BROMIDE 10 MG: 10 INJECTION INTRAVENOUS at 08:06

## 2022-06-17 RX ADMIN — HYDROMORPHONE HYDROCHLORIDE 0.25 MG: 1 INJECTION, SOLUTION INTRAMUSCULAR; INTRAVENOUS; SUBCUTANEOUS at 08:06

## 2022-06-17 RX ADMIN — FENTANYL CITRATE 25 MCG: 50 INJECTION INTRAMUSCULAR; INTRAVENOUS at 09:06

## 2022-06-17 RX ADMIN — HALOPERIDOL LACTATE 0.5 MG: 5 INJECTION, SOLUTION INTRAMUSCULAR at 10:06

## 2022-06-17 RX ADMIN — PANTOPRAZOLE SODIUM 40 MG: 40 TABLET, DELAYED RELEASE ORAL at 10:06

## 2022-06-17 RX ADMIN — ROCURONIUM BROMIDE 20 MG: 10 INJECTION INTRAVENOUS at 08:06

## 2022-06-17 RX ADMIN — Medication 100 MCG: at 07:06

## 2022-06-17 RX ADMIN — Medication 10 MG: at 08:06

## 2022-06-17 RX ADMIN — GLYCOPYRROLATE 0.6 MG: 0.2 INJECTION, SOLUTION INTRAMUSCULAR; INTRAVITREAL at 09:06

## 2022-06-17 RX ADMIN — Medication 10 MG: at 09:06

## 2022-06-17 RX ADMIN — PANCRELIPASE 2 CAPSULE: 24000; 76000; 120000 CAPSULE, DELAYED RELEASE PELLETS ORAL at 09:06

## 2022-06-17 RX ADMIN — GABAPENTIN 300 MG: 300 CAPSULE ORAL at 10:06

## 2022-06-17 RX ADMIN — GABAPENTIN 300 MG: 300 CAPSULE ORAL at 06:06

## 2022-06-17 RX ADMIN — ROCURONIUM BROMIDE 40 MG: 10 INJECTION INTRAVENOUS at 07:06

## 2022-06-17 RX ADMIN — ONDANSETRON 4 MG: 2 INJECTION INTRAMUSCULAR; INTRAVENOUS at 09:06

## 2022-06-17 RX ADMIN — PANCRELIPASE 2 CAPSULE: 24000; 76000; 120000 CAPSULE, DELAYED RELEASE PELLETS ORAL at 04:06

## 2022-06-17 NOTE — OP NOTE
[facility]  Operative Note    Date: 06/17/2022    Pre-Op Diagnosis: rectal prolapse    Post-Op Diagnosis: Same     Procedure(s) Performed:   1.  Cystoscopy with bilateral ureteral catheter placement    Specimen(s): none    Staff Surgeon: Jhonatan Quezada MD    Assistant Surgeon: MD Brennon Ovalle MD    Anesthesia: General endotracheal anesthesia    Indications: Sheila Ortez is a 37 y.o. female with rectal prolapse.  Dr. Wilburn has requested intra-operative ureteral catheters to allow for early intra-operative identification and repair of any injuries.      Findings:   1. Normal appearing bladder mucosa  2. Bilateral UO in correct anatomical position. Bilateral 5 Fr ureteral catheters placed with no difficulty    Estimated Blood Loss: min    Drains:   1.  bilateral 5 Fr ureteral catheters  2.  16 Fr juarez catheter    Procedure in Detail: Upon entering the room the patient was under general anesthesia.  The patient was then placed in the dorsal lithotomy position and prepped and draped in the usual sterile fashion. Preoperative antibiotics were administered per the primary surgeon preference.  Timeout was performed.      A 22 Fr cystoscope was inserted into the urethra and formal cystourethroscopy was performed. The urethra was normal.  The right and left ureteral orifices were in the normal anatomic position. There were no mucosal abnormalities.  A 5 Fr ureteral catheter was then inserted into the right ureteral orifice and advanced up to the level of the renal pelvis. The cystoscope was then removed leaving the ureteral catheter in place.     The cystoscope was then reinserted alongside the ureteral catheter and a 5 Fr ureteral catheter was advanced into the left ureteral orifice and advanced to the level of the left renal pelvis. The cystoscope was then removed, leaving the ureteral catheter in place.    A 16 Fr juarez catheter was inserted and the balloon was filled with 10mL of  sterile water. The ureteral catheters were secured to the Fregoso catheter in the standard fashion. There were no complications with the procedure and the patient tolerated our procedure well.     The case was then turned over to the primary surgeon.     Marty Fong MD

## 2022-06-17 NOTE — TRANSFER OF CARE
"Anesthesia Transfer of Care Note    Patient: Sheila Ortez    Procedure(s) Performed: Procedure(s) (LRB):  OPEN RECTOPEXY (N/A)  CYSTOSCOPY,WITH URETERAL CATHETER INSERTION (Bilateral)    Patient location: PACU    Anesthesia Type: general    Transport from OR: Transported from OR on 6-10 L/min O2 by face mask with adequate spontaneous ventilation    Post pain: adequate analgesia    Post assessment: tolerated procedure well and no apparent anesthetic complications    Post vital signs: stable    Level of consciousness: awake    Nausea/Vomiting: no nausea/vomiting    Complications: none    Transfer of care protocol was followed      Last vitals:   Visit Vitals  BP (!) 111/56 (BP Location: Right arm, Patient Position: Lying)   Pulse 72   Temp 36.4 °C (97.5 °F) (Temporal)   Resp 14   Ht 5' 5" (1.651 m)   Wt 58.6 kg (129 lb 3 oz)   LMP  (Within Years)   SpO2 100%   Breastfeeding No   BMI 21.50 kg/m²     "

## 2022-06-17 NOTE — OP NOTE
Date of procedure:   June 17, 2022    Indications for procedure:  38 yo female with recurrent rectal prolapse.  2 prior repairs, robotic resection rectopexy and subsequent robotic rectopexy.  I have recommended to the patient that we proceed to open rectopexy, possible mesh repair    Preoperative diagnosis:   Recurrent full thickness rectopexy    Postoperative diagnosis:  same    Name of procedure:  Open suture rectopexy    Surgeon:   MIGUEL Wilburn MD    Assistant surgeon:  Tigre Rowland MD    Type of anesthesia:  GETA    EBL:  20 Cc's    Drains:  none    Specimen:  none    Findings:  1.  Prior colorectal anastomosis healthy and intact.  Large pelvic inlet, deep cul de sac and markedly lax endopelvic fascia consistent with full thickness rectal prolapse.    2.  Minimal pelvic mobilization of rectum posteriorly.  Virginal planes noted in mid rectum posteriorly and lateral and anterior pelvis without any significant prior surgical mobilization  3.  Following circumferential mobilization of the mesorectum to the levator hiatus suspension of the rectum resulted in the anterior cul de sac elevated to the level of the sacral promotory.    4.  Rectopexy performed using 0 Ethibond, three sutures on the right side    Technique in detail:  The tibial identified and placed on the operating table in supine position on a patient positioning system with sequential compression devices on her lower extremities.  The patient had undergone an outpatient oral mechanical and oral antibiotic bowel preparation.  She received intravenous antibiotics.  She underwent general endotracheal anesthesia without complication.  She was then positioned in the modified lithotomy position and padded Yellofin stirrups.  Both arms were tucked at her side.  A critical time-out was performed.  The urology team then performed cystoscopy with bilateral ureteral catheter insertion.  Fregoso catheter was inserted by the urology team as well.  Her abdomen  was then prepared and draped in usual fashion.    Patient was explored through a low midline incision extending from the symphysis pubis to below the umbilicus.  Linea alba was opened along the length of the skin incision and the abdomen is entered with care being taken to avoid injury to the underlying viscera.  Minimal adhesions of the omentum to the anterior abdominal wall were taken down sharply.  The small bowel was eviscerated and there was evidence of a prior bili 0 pancreatic duodenal switch operation.  The small bowel and colon were packed into the upper abdomen.  Self retraining retractor was employed for exposure.  The left colon was identified.  Prior colorectal anastomosis was noted.  This was above the level of the sacral promontory.  There was noted to be markedly redundant left colon and sigmoid colon without evidence of any abnormality of the anastomosis itself.  The pelvis was noted be remarkable for large pelvic inlet.  There was marked laxity of the endopelvic fascia as well as a deep anterior cul-de-sac.  There was evidence of permanent suture in the region of the sacral promontory consistent with prior suture rectopexy.  However, what was notable was the fact that the mid and distal aspects of the pelvis were relatively undisturbed and there was no evidence of any prior anterior dissection.    The rectum and sigmoid colon were retracted cephalad out of the pelvis.  The right side of the retroperitoneum along side of the rectosigmoid junction was scored using the Bovie and the dissection was carried close to the mesorectal fascia avoiding entry into the retroperitoneum.  Exposure was obtained using lighted deep pelvic retractors.  The retroperitoneal dissection was carried down to the low pelvis and the mesial rectum was mobilized from right to left posterior to the mesorectum under direct vision.  I then exposed the left side and the retroperitoneum was scored and we completed the upper 2/3 of  the mobilization of the mesorectum with care being taken to mobilize the sigmoid colon mesentery off of the sacral promontory up posteriorly.  Deep pelvic retractors were then placed up O2 posterior to the mesorectum and the dissection was carried out to the level of the levator hiatus posteriorly under direct vision.  I then scored the cul-de-sac anteriorly.  Again this was noted to be a virginal plane without any previous surgical dissection.  We dissected anteriorly in the rectovaginal septum with care being taken to avoid injury to the posterior aspect of the vagina.  This dissection was carried distally to the urogenital hiatus.  Lastly the lateral aspect of the mesorectum, so-called lateral stalks were mobilized under direct vision down to the level of the levator hiatus for a complete circumferential dissection.  I confirmed that I was adequately low to the top of the anal canal by performing intraoperative digital rectal examination.  Pelvis carefully inspected.  Hemostasis was assured.  We then suspended the rectum and it was noted that the anterior cul-de-sac had now been elevated to the level of the sacral promontory.    Suture rectopexy was performed using 0 Ethibond suture.  Three sutures were placed beginning at the sacral promontory and space 1-2 cm apart distally.  Purchases of the mesorectum along the right side of the rectum was performed also obtaining approaches of the peritoneum covering the anterolateral aspect of the rectum.  Again this allowed us to fix the rectum suspending the anterior cul-de-sac now to the level of the sacral promontory.  The decision was made to avoid placement of mesh given the fact that it was felt by the attending surgeon that the prior repairs had not adequately mobilized the distal half of the lateral and posterior aspects of the rectum nor had there been any mobilization anteriorly.  Long-term it was felt that the patient would be best served by avoidance of  permanent mesh placement and that complete mobilization of the rectum provided excellent suspension and repair of the prolapse.  The sutures were tied down.  The bowel was returned to its anatomic position.    Transversus abdominis plane block was performed.  The wound was closed in layers.  One PDS suture was used to approximate the linea alba.  Subcutaneous tissues were irrigated with saline solution.  The skin was repaired using subcuticular Monocryl and Dermabond.    The patient was returned to the supine position, awakened from anesthesia and extubated without incident.  The patient was returned to the recovery area in stable condition.

## 2022-06-17 NOTE — PLAN OF CARE
Pt arrived to unit via bed, VSS on RA, no c/o pain. No distress noted.  Oriented to room. SCDs in place.

## 2022-06-17 NOTE — NURSING TRANSFER
Nursing Transfer Note      6/17/2022     Reason patient is being transferred: to room post recovery    Transfer to 1049    Transfer via bed    Transfer with personal belongings in duffle bag and purse    Transported by monica Yuen    Medicines sent: IVF infusing per pump    Any special needs or follow-up needed: N/A    Chart send with patient: Yes    Notified: Spouse and Father    Patient reassessed at: 6/17/22 @ 1525    Upon arrival to floor: [facility]AS[facility]

## 2022-06-17 NOTE — PLAN OF CARE
POC reviewed with pt, verbalized understanding.    - AAOx4, VSS on RA.  - Pain managed with scheduled and PRN meds  - Midline incision to abdomen with dermabond.  - MIVF infusing at 40 mL/hr  - Fregoso removed as per order, p tolerated well. Due to void at 0030 6/18  - Low fiber/ low res diet, tolerating well.  - SCD's in place.  - Pt ambulates independently.    All needs met, no complaints offered at this time. Bed locked in lowest position, call bell within reach. Frequent rounding for safety.

## 2022-06-17 NOTE — ANESTHESIA POSTPROCEDURE EVALUATION
Anesthesia Post Evaluation    Patient: Sheila Ortez    Procedure(s) Performed: Procedure(s) (LRB):  OPEN RECTOPEXY (N/A)  CYSTOSCOPY,WITH URETERAL CATHETER INSERTION (Bilateral)    Final Anesthesia Type: general      Patient location during evaluation: PACU  Patient participation: Yes- Able to Participate  Level of consciousness: awake and alert  Post-procedure vital signs: reviewed and stable  Pain management: adequate  Airway patency: patent  RASTA mitigation strategies: Extubation while patient is awake  PONV status at discharge: No PONV  Anesthetic complications: no      Cardiovascular status: stable  Respiratory status: unassisted and spontaneous ventilation  Hydration status: euvolemic  Follow-up not needed.          Vitals Value Taken Time   BP 91/51 06/17/22 1619   Temp 36.4 °C (97.6 °F) 06/17/22 1619   Pulse 75 06/17/22 1619   Resp 18 06/17/22 1634   SpO2 94 % 06/17/22 1619         Event Time   Out of Recovery 10:07:00         Pain/Michael Score: Pain Rating Prior to Med Admin: 9 (6/17/2022  4:34 PM)  Pain Rating Post Med Admin: 4 (6/17/2022 12:30 PM)  Michael Score: 10 (6/17/2022 10:12 AM)

## 2022-06-17 NOTE — ANESTHESIA RELEASE NOTE
"Anesthesia Release from PACU Note    Patient: Sheila Ortez    Procedure(s) Performed: Procedure(s) (LRB):  OPEN RECTOPEXY (N/A)  CYSTOSCOPY,WITH URETERAL CATHETER INSERTION (Bilateral)    Anesthesia type: general    Post pain: Adequate analgesia    Post assessment: no apparent anesthetic complications, tolerated procedure well and no evidence of recall    Last Vitals:   Visit Vitals  BP (!) 91/55 (BP Location: Left arm, Patient Position: Lying)   Pulse 66   Temp 36.5 °C (97.7 °F) (Temporal)   Resp 13   Ht 5' 5" (1.651 m)   Wt 58.6 kg (129 lb 3 oz)   LMP  (Within Years)   SpO2 97%   Breastfeeding No   BMI 21.50 kg/m²       Post vital signs: stable    Level of consciousness: lethargic    Nausea/Vomiting: no nausea/no vomiting    Complications: none    Airway Patency: patent    Respiratory: unassisted    Cardiovascular: stable and blood pressure at baseline    Hydration: euvolemic  "

## 2022-06-17 NOTE — ANESTHESIA PROCEDURE NOTES
Intubation    Date/Time: 6/17/2022 7:11 AM  Performed by: Jeff Singleton DO  Authorized by: Luis Antonio Chang MD     Intubation:     Induction:  Intravenous    Intubated:  Postinduction    Mask Ventilation:  Easy mask    Attempts:  1    Attempted By:  Resident anesthesiologist    Method of Intubation:  Direct    Blade:  Diane 2    Laryngeal View Grade: Grade I - full view of cords      Difficult Airway Encountered?: No      Complications:  None    Airway Device:  Oral endotracheal tube    Airway Device Size:  7.0    Style/Cuff Inflation:  Cuffed (inflated to minimal occlusive pressure)    Placement Verified By:  Capnometry    Complicating Factors:  None    Findings Post-Intubation:  BS equal bilateral and atraumatic/condition of teeth unchanged

## 2022-06-17 NOTE — BRIEF OP NOTE
[facility] ([facility])  Brief Operative Note    SUMMARY     Surgery Date: 6/17/2022     Surgeon(s) and Role:  Panel 1:     * MIGUEL Wilburn MD - Primary     * Tigre Rowland MD - Resident - Assisting  Panel 2:     * Jhonatan Quezada Jr., MD - Primary     * Ubaldo Bill MD - Resident - Assisting     * Marty Fong MD - Resident - Assisting        Pre-op Diagnosis:  Rectal prolapse [K62.3]    Post-op Diagnosis:  Post-Op Diagnosis Codes:     * Rectal prolapse [K62.3]    Procedure(s) (LRB):  OPEN RECTOPEXY (N/A)  CYSTOSCOPY,WITH URETERAL CATHETER INSERTION (Bilateral)    Anesthesia: General    Operative Findings: see operative report.    Estimated Blood Loss: * No values recorded between 6/17/2022  7:39 AM and 6/17/2022  9:52 AM *    Estimated Blood Loss has not been documented. EBL = 20cc.         Specimens:   Specimen (24h ago, onward)            None          [id number]

## 2022-06-18 VITALS
RESPIRATION RATE: 18 BRPM | WEIGHT: 129.19 LBS | HEIGHT: 65 IN | DIASTOLIC BLOOD PRESSURE: 55 MMHG | HEART RATE: 84 BPM | SYSTOLIC BLOOD PRESSURE: 104 MMHG | BODY MASS INDEX: 21.52 KG/M2 | OXYGEN SATURATION: 94 % | TEMPERATURE: 98 F

## 2022-06-18 LAB
ANION GAP SERPL CALC-SCNC: 9 MMOL/L (ref 8–16)
BASOPHILS # BLD AUTO: 0.05 K/UL (ref 0–0.2)
BASOPHILS NFR BLD: 0.3 % (ref 0–1.9)
BUN SERPL-MCNC: 8 MG/DL (ref 6–20)
CALCIUM SERPL-MCNC: 7.5 MG/DL (ref 8.7–10.5)
CHLORIDE SERPL-SCNC: 111 MMOL/L (ref 95–110)
CO2 SERPL-SCNC: 14 MMOL/L (ref 23–29)
CREAT SERPL-MCNC: 0.6 MG/DL (ref 0.5–1.4)
DIFFERENTIAL METHOD: ABNORMAL
EOSINOPHIL # BLD AUTO: 0.3 K/UL (ref 0–0.5)
EOSINOPHIL NFR BLD: 1.6 % (ref 0–8)
ERYTHROCYTE [DISTWIDTH] IN BLOOD BY AUTOMATED COUNT: 17.8 % (ref 11.5–14.5)
EST. GFR  (AFRICAN AMERICAN): >60 ML/MIN/1.73 M^2
EST. GFR  (NON AFRICAN AMERICAN): >60 ML/MIN/1.73 M^2
GLUCOSE SERPL-MCNC: 75 MG/DL (ref 70–110)
HCT VFR BLD AUTO: 25.8 % (ref 37–48.5)
HGB BLD-MCNC: 7.7 G/DL (ref 12–16)
IMM GRANULOCYTES # BLD AUTO: 0.11 K/UL (ref 0–0.04)
IMM GRANULOCYTES NFR BLD AUTO: 0.7 % (ref 0–0.5)
LYMPHOCYTES # BLD AUTO: 1 K/UL (ref 1–4.8)
LYMPHOCYTES NFR BLD: 6.2 % (ref 18–48)
MCH RBC QN AUTO: 23.8 PG (ref 27–31)
MCHC RBC AUTO-ENTMCNC: 29.8 G/DL (ref 32–36)
MCV RBC AUTO: 80 FL (ref 82–98)
MONOCYTES # BLD AUTO: 1 K/UL (ref 0.3–1)
MONOCYTES NFR BLD: 6.7 % (ref 4–15)
NEUTROPHILS # BLD AUTO: 13 K/UL (ref 1.8–7.7)
NEUTROPHILS NFR BLD: 84.5 % (ref 38–73)
NRBC BLD-RTO: 0 /100 WBC
PLATELET # BLD AUTO: 529 K/UL (ref 150–450)
PMV BLD AUTO: 9.1 FL (ref 9.2–12.9)
POTASSIUM SERPL-SCNC: 4.7 MMOL/L (ref 3.5–5.1)
RBC # BLD AUTO: 3.24 M/UL (ref 4–5.4)
SODIUM SERPL-SCNC: 134 MMOL/L (ref 136–145)
WBC # BLD AUTO: 15.44 K/UL (ref 3.9–12.7)

## 2022-06-18 PROCEDURE — 63600175 PHARM REV CODE 636 W HCPCS: Performed by: SURGERY

## 2022-06-18 PROCEDURE — 25000003 PHARM REV CODE 250

## 2022-06-18 PROCEDURE — 36415 COLL VENOUS BLD VENIPUNCTURE: CPT | Performed by: SURGERY

## 2022-06-18 PROCEDURE — 80048 BASIC METABOLIC PNL TOTAL CA: CPT | Performed by: SURGERY

## 2022-06-18 PROCEDURE — 85025 COMPLETE CBC W/AUTO DIFF WBC: CPT | Performed by: COLON & RECTAL SURGERY

## 2022-06-18 PROCEDURE — 25000003 PHARM REV CODE 250: Performed by: SURGERY

## 2022-06-18 PROCEDURE — 36415 COLL VENOUS BLD VENIPUNCTURE: CPT | Performed by: COLON & RECTAL SURGERY

## 2022-06-18 RX ORDER — GABAPENTIN 300 MG/1
300 CAPSULE ORAL 3 TIMES DAILY
Qty: 90 CAPSULE | Refills: 11 | Status: ON HOLD | OUTPATIENT
Start: 2022-06-18 | End: 2022-12-04 | Stop reason: HOSPADM

## 2022-06-18 RX ORDER — ACETAMINOPHEN 500 MG
1000 TABLET ORAL EVERY 8 HOURS
Qty: 84 TABLET | Refills: 0 | Status: SHIPPED | OUTPATIENT
Start: 2022-06-18 | End: 2022-07-02

## 2022-06-18 RX ORDER — IBUPROFEN 800 MG/1
800 TABLET ORAL EVERY 8 HOURS
Qty: 42 TABLET | Refills: 0 | Status: SHIPPED | OUTPATIENT
Start: 2022-06-18 | End: 2022-07-02

## 2022-06-18 RX ORDER — OXYCODONE HYDROCHLORIDE 5 MG/1
5 TABLET ORAL EVERY 6 HOURS PRN
Qty: 28 TABLET | Refills: 0 | Status: ON HOLD | OUTPATIENT
Start: 2022-06-18 | End: 2022-12-04 | Stop reason: HOSPADM

## 2022-06-18 RX ADMIN — PANTOPRAZOLE SODIUM 40 MG: 40 TABLET, DELAYED RELEASE ORAL at 08:06

## 2022-06-18 RX ADMIN — GABAPENTIN 300 MG: 300 CAPSULE ORAL at 08:06

## 2022-06-18 RX ADMIN — HYDROMORPHONE HYDROCHLORIDE 0.25 MG: 1 INJECTION, SOLUTION INTRAMUSCULAR; INTRAVENOUS; SUBCUTANEOUS at 12:06

## 2022-06-18 RX ADMIN — OXCARBAZEPINE 150 MG: 150 TABLET, FILM COATED ORAL at 08:06

## 2022-06-18 RX ADMIN — OXYCODONE HYDROCHLORIDE 10 MG: 10 TABLET ORAL at 06:06

## 2022-06-18 RX ADMIN — PANCRELIPASE 2 CAPSULE: 24000; 76000; 120000 CAPSULE, DELAYED RELEASE PELLETS ORAL at 08:06

## 2022-06-18 RX ADMIN — SODIUM CHLORIDE 1000 ML: 0.9 INJECTION, SOLUTION INTRAVENOUS at 09:06

## 2022-06-18 RX ADMIN — ACETAMINOPHEN 1000 MG: 10 INJECTION INTRAVENOUS at 06:06

## 2022-06-18 RX ADMIN — HYDROMORPHONE HYDROCHLORIDE 0.25 MG: 1 INJECTION, SOLUTION INTRAMUSCULAR; INTRAVENOUS; SUBCUTANEOUS at 08:06

## 2022-06-18 RX ADMIN — IBUPROFEN 800 MG: 800 INJECTION INTRAVENOUS at 06:06

## 2022-06-18 RX ADMIN — PREDNISONE 20 MG: 5 TABLET ORAL at 08:06

## 2022-06-18 RX ADMIN — CARIPRAZINE 3 MG: 3 CAPSULE, GELATIN COATED ORAL at 08:06

## 2022-06-18 RX ADMIN — MUPIROCIN: 20 OINTMENT TOPICAL at 08:06

## 2022-06-18 NOTE — PLAN OF CARE
POC reviewed with pt, verbalized understanding.     - AAOx4, VSS on RA.  - Pain managed with scheduled and PRN meds  - Midline incision to abdomen with dermabond.  - MIVF infusing at 40 mL/hr  - Voiding without difficulty, up to BR, adequate UOP  - Low fiber/ low res diet, tolerating well.  - SCD's refused.  - Pt ambulates independently.  - Discharge planned for today.    All needs met, no complaints offered at this time. Bed locked in lowest position, call bell within reach. Frequent rounding for safety.

## 2022-06-18 NOTE — DISCHARGE SUMMARY
[facility]  General Surgery  Discharge Summary      Patient Name: Sheila Ortez  MRN: [id number]  Admission Date: 6/17/2022  Hospital Length of Stay: 1 days  Discharge Date and Time:  06/18/2022 8:56 AM  Attending Physician: MIGUEL Wilburn MD   Discharging Provider: Edgar Lyon MD  Primary Care Provider: Primary Doctor No     HPI: Patient is a 37 y.o. female presents with recurrent rectal prolapse.      8-2017 robotic rectopexy   robotic resection rectopexy     Recurrent protrusion.   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  On Creon.  NOT on questran.  Sometimes has fecal incontinence.  Wears diaper.         Procedure(s) (LRB):  OPEN RECTOPEXY (N/A)  CYSTOSCOPY,WITH URETERAL CATHETER INSERTION (Bilateral)     Hospital Course:   Please see daily progress notes for full hospital course. Briefly, the patient went to the OR for an Open Rectopexy for a Rectal Prolapse, recurrent on 6/17/22.  The patient tolerated the procedure well and was transferred to the PACU in stable condition.  Their post op course was uncomplicated.  The patients pain was controlled with PO medications.  Ambulating without issue.  Voiding without issue with adequate urine output. Passing gas and stool.  Tolerating diet without nausea or vomiting.  Incision site is clean, dry, and intact.  Discharged on 6/18/22 with a 2 week follow  Up with Dr. Wilburn.      Consults: None    Significant Diagnostic Studies: Labs:   CMP   Recent Labs   Lab 06/18/22  0632   *   K 4.7   *   CO2 14*   GLU 75   BUN 8   CREATININE 0.6   CALCIUM 7.5*   ANIONGAP 9   ESTGFRAFRICA >60.0   EGFRNONAA >60.0    and CBC   Recent Labs   Lab 06/17/22  1230   WBC 23.19*   HGB 9.0*   HCT 30.8*   *     Physical exam  General: normal appearing female in NAD  HEENT: Normocephalic, atraumatic, PERRLA  Pulm: normal breathsounds, no increased work of breathing  Cardio: RRR  GI: soft appropriately ttp, non distended, incision is cdi   MSK: ROM  normal  Skin: warm and dry and without erythema  Neuro: CN 2-12 grossly intact  Psych: normal mood    Pending Diagnostic Studies:     Procedure Component Value Units Date/Time    CBC auto differential [[id number]] Collected: 06/18/22 0632    Order Status: Sent Lab Status: In process Updated: 06/18/22 0747    Specimen: Blood         Final Active Diagnoses:    Diagnosis Date Noted POA    PRINCIPAL PROBLEM:  Rectal prolapse [K62.3] 06/17/2022 Yes      Problems Resolved During this Admission:      Discharged Condition: good    Disposition:     Follow Up:   Follow-up Information     H Samuel Wilburn MD Follow up in 2 week(s).    Specialty: Colon and Rectal Surgery  Contact information:  [address] [place] [id number]  [phone]                       Patient Instructions:      No dressing needed   Order Comments: Continue to shower, do not soak (baths, swimming) until seen in clinic by Dr. Wilburn.     Notify your health care provider if you experience any of the following:  temperature >100.4     Notify your health care provider if you experience any of the following:  persistent nausea and vomiting or diarrhea     Notify your health care provider if you experience any of the following:  severe uncontrolled pain     Notify your health care provider if you experience any of the following:  redness, tenderness, or signs of infection (pain, swelling, redness, odor or green/yellow discharge around incision site)     Notify your health care provider if you experience any of the following:  difficulty breathing or increased cough     Notify your health care provider if you experience any of the following:  severe persistent headache     Notify your health care provider if you experience any of the following:  worsening rash     Notify your health care provider if you experience any of the following:  persistent dizziness, light-headedness, or visual disturbances     Notify your health care provider if you experience  any of the following:  increased confusion or weakness     Activity as tolerated     Medications:  Reconciled Home Medications:      Medication List      START taking these medications    acetaminophen 500 MG tablet  Commonly known as: TYLENOL  Take 2 tablets (1,000 mg total) by mouth every 8 (eight) hours. for 14 days     gabapentin 300 MG capsule  Commonly known as: NEURONTIN  Take 1 capsule (300 mg total) by mouth 3 (three) times daily.     ibuprofen 800 MG tablet  Commonly known as: ADVIL,MOTRIN  Take 1 tablet (800 mg total) by mouth every 8 (eight) hours. for 14 days     oxyCODONE 5 MG immediate release tablet  Commonly known as: ROXICODONE  Take 1 tablet (5 mg total) by mouth every 6 (six) hours as needed for Pain.        CONTINUE taking these medications    CREON 24,000-76,000 -120,000 unit capsule  Generic drug: lipase-protease-amylase 24,000-76,000-120,000 units  Take 2 capsules by mouth 3 (three) times daily.     indomethacin 50 MG capsule  Commonly known as: INDOCIN  50 mg.     metroNIDAZOLE 500 MG tablet  Commonly known as: FLAGYL  Take 1 tablet (500 mg total) by mouth 3 (three) times daily. Take one tablet at 1pm, 1 tablet at 2pm & 1 tablet at 11pm.     neomycin 500 mg Tab  Commonly known as: MYCIFRADIN  Take 2 tablets at 1pm, 2pm and 11pm.     OXcarbazepine 150 MG Tab  Commonly known as: TRILEPTAL  Take 150 mg by mouth 2 (two) times daily.     oxyCODONE-acetaminophen  mg per tablet  Commonly known as: PERCOCET  1 tab, Oral, q6h, PRN for pain, 0 Refill(s)     pantoprazole 40 MG tablet  Commonly known as: PROTONIX  40 mg.     predniSONE 20 MG tablet  Commonly known as: DELTASONE  20 mg.     tiZANidine 4 MG tablet  Commonly known as: ZANAFLEX  4 mg.     traZODone 100 MG tablet  Commonly known as: DESYREL  100 mg.     UNABLE TO FIND  325 mg.     VRAYLAR 3 mg Cap  Generic drug: cariprazine  TAKE ONE CAPSULE BY MOUTH EVERY DAY THANK YOU            Edgar Lyon MD  General Surgery  [facility]  [facility]

## 2022-06-18 NOTE — PLAN OF CARE
Discharge instructions given, all questions answered. PIVs removed, pt tolerated well. Pharmacy to deliver meds. Pt ambulated off unit.

## 2022-10-12 ENCOUNTER — OFFICE VISIT (OUTPATIENT)
Dept: SURGERY | Facility: CLINIC | Age: 37
End: 2022-10-12
Payer: MEDICAID

## 2022-10-12 ENCOUNTER — OFFICE VISIT (OUTPATIENT)
Dept: WOUND CARE | Facility: CLINIC | Age: 37
End: 2022-10-12
Payer: MEDICAID

## 2022-10-12 ENCOUNTER — LAB VISIT (OUTPATIENT)
Dept: LAB | Facility: HOSPITAL | Age: 37
End: 2022-10-12
Attending: COLON & RECTAL SURGERY
Payer: MEDICAID

## 2022-10-12 DIAGNOSIS — K62.3 RECTAL PROLAPSE: ICD-10-CM

## 2022-10-12 DIAGNOSIS — Z93.2 ILEOSTOMY IN PLACE: Primary | ICD-10-CM

## 2022-10-12 DIAGNOSIS — E44.0 MODERATE PROTEIN-CALORIE MALNUTRITION: Primary | ICD-10-CM

## 2022-10-12 DIAGNOSIS — Z43.3 ATTENTION TO COLOSTOMY: Primary | ICD-10-CM

## 2022-10-12 DIAGNOSIS — Z93.3 COLOSTOMY STATUS: ICD-10-CM

## 2022-10-12 DIAGNOSIS — K62.89 PROCTITIS: ICD-10-CM

## 2022-10-12 DIAGNOSIS — E44.0 MODERATE PROTEIN-CALORIE MALNUTRITION: ICD-10-CM

## 2022-10-12 LAB
ALBUMIN SERPL BCP-MCNC: 3.6 G/DL (ref 3.5–5.2)
ALP SERPL-CCNC: 84 U/L (ref 55–135)
ALT SERPL W/O P-5'-P-CCNC: 22 U/L (ref 10–44)
ANION GAP SERPL CALC-SCNC: 4 MMOL/L (ref 8–16)
AST SERPL-CCNC: 15 U/L (ref 10–40)
BASOPHILS # BLD AUTO: 0.05 K/UL (ref 0–0.2)
BASOPHILS NFR BLD: 0.8 % (ref 0–1.9)
BILIRUB SERPL-MCNC: 0.4 MG/DL (ref 0.1–1)
BUN SERPL-MCNC: 12 MG/DL (ref 6–20)
CALCIUM SERPL-MCNC: 8.3 MG/DL (ref 8.7–10.5)
CHLORIDE SERPL-SCNC: 109 MMOL/L (ref 95–110)
CO2 SERPL-SCNC: 26 MMOL/L (ref 23–29)
CREAT SERPL-MCNC: 0.6 MG/DL (ref 0.5–1.4)
CRP SERPL-MCNC: 0.4 MG/L (ref 0–8.2)
DIFFERENTIAL METHOD: ABNORMAL
EOSINOPHIL # BLD AUTO: 0.2 K/UL (ref 0–0.5)
EOSINOPHIL NFR BLD: 2.4 % (ref 0–8)
ERYTHROCYTE [DISTWIDTH] IN BLOOD BY AUTOMATED COUNT: 17.1 % (ref 11.5–14.5)
EST. GFR  (NO RACE VARIABLE): >60 ML/MIN/1.73 M^2
GLUCOSE SERPL-MCNC: 77 MG/DL (ref 70–110)
HCT VFR BLD AUTO: 34.4 % (ref 37–48.5)
HGB BLD-MCNC: 10.6 G/DL (ref 12–16)
IMM GRANULOCYTES # BLD AUTO: 0.01 K/UL (ref 0–0.04)
IMM GRANULOCYTES NFR BLD AUTO: 0.2 % (ref 0–0.5)
LYMPHOCYTES # BLD AUTO: 2 K/UL (ref 1–4.8)
LYMPHOCYTES NFR BLD: 31.7 % (ref 18–48)
MCH RBC QN AUTO: 25.1 PG (ref 27–31)
MCHC RBC AUTO-ENTMCNC: 30.8 G/DL (ref 32–36)
MCV RBC AUTO: 81 FL (ref 82–98)
MONOCYTES # BLD AUTO: 0.4 K/UL (ref 0.3–1)
MONOCYTES NFR BLD: 6.1 % (ref 4–15)
NEUTROPHILS # BLD AUTO: 3.7 K/UL (ref 1.8–7.7)
NEUTROPHILS NFR BLD: 58.8 % (ref 38–73)
NRBC BLD-RTO: 0 /100 WBC
PLATELET # BLD AUTO: 369 K/UL (ref 150–450)
PMV BLD AUTO: 9.5 FL (ref 9.2–12.9)
POTASSIUM SERPL-SCNC: 4.3 MMOL/L (ref 3.5–5.1)
PREALB SERPL-MCNC: 15 MG/DL (ref 20–43)
PROT SERPL-MCNC: 6.3 G/DL (ref 6–8.4)
RBC # BLD AUTO: 4.23 M/UL (ref 4–5.4)
SODIUM SERPL-SCNC: 139 MMOL/L (ref 136–145)
WBC # BLD AUTO: 6.22 K/UL (ref 3.9–12.7)

## 2022-10-12 PROCEDURE — 99024 PR POST-OP FOLLOW-UP VISIT: ICD-10-PCS | Mod: ,,, | Performed by: CLINICAL NURSE SPECIALIST

## 2022-10-12 PROCEDURE — 99214 OFFICE O/P EST MOD 30 MIN: CPT | Mod: S$PBB,,, | Performed by: COLON & RECTAL SURGERY

## 2022-10-12 PROCEDURE — 99213 OFFICE O/P EST LOW 20 MIN: CPT | Mod: PBBFAC,27 | Performed by: COLON & RECTAL SURGERY

## 2022-10-12 PROCEDURE — 85025 COMPLETE CBC W/AUTO DIFF WBC: CPT | Performed by: COLON & RECTAL SURGERY

## 2022-10-12 PROCEDURE — 1159F MED LIST DOCD IN RCRD: CPT | Mod: CPTII,,, | Performed by: CLINICAL NURSE SPECIALIST

## 2022-10-12 PROCEDURE — 36415 COLL VENOUS BLD VENIPUNCTURE: CPT | Performed by: COLON & RECTAL SURGERY

## 2022-10-12 PROCEDURE — 84134 ASSAY OF PREALBUMIN: CPT | Performed by: COLON & RECTAL SURGERY

## 2022-10-12 PROCEDURE — 80053 COMPREHEN METABOLIC PANEL: CPT | Performed by: COLON & RECTAL SURGERY

## 2022-10-12 PROCEDURE — 1160F PR REVIEW ALL MEDS BY PRESCRIBER/CLIN PHARMACIST DOCUMENTED: ICD-10-PCS | Mod: CPTII,,, | Performed by: CLINICAL NURSE SPECIALIST

## 2022-10-12 PROCEDURE — 1159F PR MEDICATION LIST DOCUMENTED IN MEDICAL RECORD: ICD-10-PCS | Mod: CPTII,,, | Performed by: CLINICAL NURSE SPECIALIST

## 2022-10-12 PROCEDURE — 99024 POSTOP FOLLOW-UP VISIT: CPT | Mod: ,,, | Performed by: CLINICAL NURSE SPECIALIST

## 2022-10-12 PROCEDURE — 99999 PR PBB SHADOW E&M-EST. PATIENT-LVL III: ICD-10-PCS | Mod: PBBFAC,,, | Performed by: COLON & RECTAL SURGERY

## 2022-10-12 PROCEDURE — 86140 C-REACTIVE PROTEIN: CPT | Performed by: COLON & RECTAL SURGERY

## 2022-10-12 PROCEDURE — 99999 PR PBB SHADOW E&M-EST. PATIENT-LVL III: CPT | Mod: PBBFAC,,, | Performed by: COLON & RECTAL SURGERY

## 2022-10-12 PROCEDURE — 99213 OFFICE O/P EST LOW 20 MIN: CPT | Mod: PBBFAC | Performed by: CLINICAL NURSE SPECIALIST

## 2022-10-12 PROCEDURE — 1160F RVW MEDS BY RX/DR IN RCRD: CPT | Mod: CPTII,,, | Performed by: CLINICAL NURSE SPECIALIST

## 2022-10-12 PROCEDURE — 99999 PR PBB SHADOW E&M-EST. PATIENT-LVL III: CPT | Mod: PBBFAC,,, | Performed by: CLINICAL NURSE SPECIALIST

## 2022-10-12 PROCEDURE — 99999 PR PBB SHADOW E&M-EST. PATIENT-LVL III: ICD-10-PCS | Mod: PBBFAC,,, | Performed by: CLINICAL NURSE SPECIALIST

## 2022-10-12 PROCEDURE — 99214 PR OFFICE/OUTPT VISIT, EST, LEVL IV, 30-39 MIN: ICD-10-PCS | Mod: S$PBB,,, | Performed by: COLON & RECTAL SURGERY

## 2022-10-12 NOTE — PROGRESS NOTES
This is a SAME DAY pt who came to see Dr Wilburn about colostomy closure but is out of supplies and not getting order from [facility] for a few days yet, she asks if I can help her.     She has nice budded stoma, wears convatec moldable   I gave her lyubov flat NEW IMAGE x 3 to have   Pt appreciative   Surgery is in December

## 2022-10-12 NOTE — PROGRESS NOTES
HPI:  Sheila Ortez is a 37 y.o. female with history of rectal prolapse    2022  Redo suture rectopexy.  Minimal blood loss.  Did well after surgery.  D/C postoperative 2.  Spoke to pt by phone POD 7 and was doing well.      Had problems evacuating on POD 9 and strained.  Passed large amt of stool in shower.  No pain. Went to sleep fine.  Then awakened with severe abd pain.  Brought urgently to ED and OR for free air and peritonitis.      Feculent peritonitis at time of exploration (personal phone communication with Dr Zimmerman MIS surgeon in [facility].  He found a rectal perforation unclear if adjacent to rectopexy.  He performed Julianna's procedure.      Two issues following d/c from hospital.  She reports having problems with BMs - first had problems with one piece appliance.  Better with two piece.  Not having blow outs.    Took ATBs.  No diarrhea since antibioitics (see below).    However stool pasty and now hard to evacuate bag.      Having d/c from rectum, mucous.  Fecal material initially.  Treated with cipro flagyl.  Got better.  Stopped ATBs now with recurrent mucous.  No blood.      Appetite and energy improving.      2022 CT scan showed enteritis and proctitis.       To see GI tomorrow.        No past medical history on file.     Past Surgical History:   Procedure Laterality Date    ABDOMINAL SURGERY       SECTION      CHOLECYSTECTOMY      COLON SURGERY      GASTROPLASTY DUODENAL SWITCH      PROCTOPEXY N/A 2022    Procedure: OPEN RECTOPEXY;  Surgeon: MIGUEL Wilburn MD;  Location: [facility] OR [address] [place] [address];  Service: Colon and Rectal;  Laterality: N/A;       Review of patient's allergies indicates:   Allergen Reactions    Cephalosporins Anaphylaxis     Other reaction(s): Infection  Pt can take penicillin      Metoclopramide Other (See Comments)     Other reaction(s): Infection  Involuntary muscle spasms         No family history on file.    Social History     Socioeconomic History     Marital status: Single   Tobacco Use    Smoking status: Every Day     Packs/day: 0.50     Types: Cigarettes    Smokeless tobacco: Never   Substance and Sexual Activity    Alcohol use: Not Currently       ROS:  GENERAL: No fever, chills, fatigability or weight loss.  Integument: No rashes, redness, icterus  CHEST: Denies ORTIZ, cyanosis, wheezing, cough and sputum production.  CARDIOVASCULAR: Denies chest pain, PND, orthopnea or reduced exercise tolerance.  GI: Denies abd pain, dysphagia, nausea, vomiting, no hematemesis   : Denies burning on urination, no hematuria, no bacteriuria  MSK: No deformities, swelling, joint pain swelling  Neurologic: No HAs, seizures, weakness, paresthesias, gait problems    PE:  General appearance well  There were no vitals taken for this visit.    Sclera/ Skin anicteric  LN none palpable  AT NC EOMI  Neck supple trachea midline   Chest symmetric, nl excursion, no retractions, breathing comfortably  Abdomen    Wound healed.      ND soft NT.  no masses, no organomegaly  EXT - no CCE  Neuro:  Mood/ affect nl, alert and oriented x 3, moves all ext's, gait nl    Rectal  Inspection nl, slight gaping  RUY decreased tone.  No mass.  Squeeze present      Assessment:  Disuse proctitis  No infection  Colostomy status  Prolapse not recurred    Plan:  Check nutritional labs.   Review CT scan  Closure of colostomy at 6 months after surgery (Dec 2022)

## 2022-10-27 NOTE — PROVATION PATIENT INSTRUCTIONS
Discharge Summary/Instructions after an Endoscopic Procedure  Patient Name: Sheila Ortez  Patient MRN: [id number]  Patient YOB: 1985 Wednesday, October 12, 2022  Samuel Wilburn MD  Dear patient,  As a result of recent federal legislation (The Federal Cures Act), you may   receive lab or pathology results from your procedure in your [facility]   account before your physician is able to contact you. Your physician or   their representative will relay the results to you with their   recommendations at their soonest availability.  Thank you,  RESTRICTIONS:  During your procedure today, you received medications for sedation.  These   medications may affect your judgment, balance and coordination.  Therefore,   for 24 hours, you have the following restrictions:   - DO NOT drive a car, operate machinery, make legal/financial decisions,   sign important papers or drink alcohol.    ACTIVITY:  Today: no heavy lifting, straining or running due to procedural   sedation/anesthesia.  The following day: return to full activity including work.  DIET:  Eat and drink normally unless instructed otherwise.     TREATMENT FOR COMMON SIDE EFFECTS:  - Mild abdominal pain, nausea, belching, bloating or excessive gas:  rest,   eat lightly and use a heating pad.  - Sore Throat: treat with throat lozenges and/or gargle with warm salt   water.  - Because air was used during the procedure, expelling large amounts of air   from your rectum or belching is normal.  - If a bowel prep was taken, you may not have a bowel movement for 1-3 days.    This is normal.  SYMPTOMS TO WATCH FOR AND REPORT TO YOUR PHYSICIAN:  1. Abdominal pain or bloating, other than gas cramps.  2. Chest pain.  3. Back pain.  4. Signs of infection such as: chills or fever occurring within 24 hours   after the procedure.  5. Rectal bleeding, which would show as bright red, maroon, or black stools.   (A tablespoon of blood from the rectum is not serious, especially  if   hemorrhoids are present.)  6. Vomiting.  7. Weakness or dizziness.  GO DIRECTLY TO THE NEAREST EMERGENCY ROOM IF YOU HAVE ANY OF THE FOLLOWING:      Difficulty breathing              Chills and/or fever over 101 F   Persistent vomiting and/or vomiting blood   Severe abdominal pain   Severe chest pain   Black, tarry stools   Bleeding- more than one tablespoon   Any other symptom or condition that you feel may need urgent attention  Your doctor recommends these additional instructions:  If any biopsies were taken, your doctors clinic will contact you in 1 to 2   weeks with any results.  - Discharge patient to home (ambulatory).   - Patient has a contact number available for emergencies.  The signs and   symptoms of potential delayed complications were discussed with the   patient.  Return to normal activities tomorrow.  Written discharge   instructions were provided to the patient.   - Resume previous diet.   - Continue present medications.   - Repeat flexible sigmoidoscopy in 6 months for surveillance.  For questions, problems or results please call your physician - Samuel Wilburn MD at Work:  [phone].  [facility], EMERGENCY ROOM PHONE NUMBER: [phone]  IF A COMPLICATION OR EMERGENCY SITUATION ARISES AND YOU ARE UNABLE TO REACH   YOUR PHYSICIAN - GO DIRECTLY TO THE EMERGENCY ROOM.  Samuel Wilburn MD  10/27/2022 12:15:22 PM  This report has been verified and signed electronically.  Dear patient,  As a result of recent federal legislation (The Federal Cures Act), you may   receive lab or pathology results from your procedure in your [facility]   account before your physician is able to contact you. Your physician or   their representative will relay the results to you with their   recommendations at their soonest availability.  Thank you,  PROVATION

## 2022-11-01 ENCOUNTER — TELEPHONE (OUTPATIENT)
Dept: SURGERY | Facility: CLINIC | Age: 37
End: 2022-11-01
Payer: MEDICAID

## 2022-11-01 NOTE — TELEPHONE ENCOUNTER
Spoke with patient. States that stoma is about 4 times the normal size, so much so that she had to pinch it to make it fit into ostomy bag and it bled. No pain, no blockage noted per patient. States she knows size will change but felt like this was very large and wondering if it is normal. Requested patient to send photo. [facility] and will send photo next time she removes her ostomy bag.

## 2022-11-02 ENCOUNTER — TELEPHONE (OUTPATIENT)
Dept: SURGERY | Facility: CLINIC | Age: 37
End: 2022-11-02
Payer: MEDICAID

## 2022-11-02 NOTE — TELEPHONE ENCOUNTER
Left message , following up about stoma.  If stoma is still protruding patient can use sugar to decrease size/revert stoma in.

## 2022-11-30 ENCOUNTER — OFFICE VISIT (OUTPATIENT)
Dept: SURGERY | Facility: CLINIC | Age: 37
End: 2022-11-30
Payer: MEDICAID

## 2022-11-30 VITALS
SYSTOLIC BLOOD PRESSURE: 106 MMHG | HEIGHT: 65 IN | DIASTOLIC BLOOD PRESSURE: 52 MMHG | WEIGHT: 135.81 LBS | HEART RATE: 80 BPM | BODY MASS INDEX: 22.63 KG/M2

## 2022-11-30 DIAGNOSIS — Z01.818 PRE-OP EVALUATION: Primary | ICD-10-CM

## 2022-11-30 DIAGNOSIS — K63.1 RECTAL PERFORATION: ICD-10-CM

## 2022-11-30 DIAGNOSIS — Z93.3 COLOSTOMY IN PLACE: ICD-10-CM

## 2022-11-30 PROCEDURE — 1159F PR MEDICATION LIST DOCUMENTED IN MEDICAL RECORD: ICD-10-PCS | Mod: CPTII,,, | Performed by: COLON & RECTAL SURGERY

## 2022-11-30 PROCEDURE — 1159F MED LIST DOCD IN RCRD: CPT | Mod: CPTII,,, | Performed by: COLON & RECTAL SURGERY

## 2022-11-30 PROCEDURE — 3078F PR MOST RECENT DIASTOLIC BLOOD PRESSURE < 80 MM HG: ICD-10-PCS | Mod: CPTII,,, | Performed by: COLON & RECTAL SURGERY

## 2022-11-30 PROCEDURE — 3008F BODY MASS INDEX DOCD: CPT | Mod: CPTII,,, | Performed by: COLON & RECTAL SURGERY

## 2022-11-30 PROCEDURE — 3074F PR MOST RECENT SYSTOLIC BLOOD PRESSURE < 130 MM HG: ICD-10-PCS | Mod: CPTII,,, | Performed by: COLON & RECTAL SURGERY

## 2022-11-30 PROCEDURE — 99213 OFFICE O/P EST LOW 20 MIN: CPT | Mod: PBBFAC | Performed by: COLON & RECTAL SURGERY

## 2022-11-30 PROCEDURE — 99214 PR OFFICE/OUTPT VISIT, EST, LEVL IV, 30-39 MIN: ICD-10-PCS | Mod: S$PBB,,, | Performed by: COLON & RECTAL SURGERY

## 2022-11-30 PROCEDURE — 3074F SYST BP LT 130 MM HG: CPT | Mod: CPTII,,, | Performed by: COLON & RECTAL SURGERY

## 2022-11-30 PROCEDURE — 3078F DIAST BP <80 MM HG: CPT | Mod: CPTII,,, | Performed by: COLON & RECTAL SURGERY

## 2022-11-30 PROCEDURE — 99999 PR PBB SHADOW E&M-EST. PATIENT-LVL III: CPT | Mod: PBBFAC,,, | Performed by: COLON & RECTAL SURGERY

## 2022-11-30 PROCEDURE — 99999 PR PBB SHADOW E&M-EST. PATIENT-LVL III: ICD-10-PCS | Mod: PBBFAC,,, | Performed by: COLON & RECTAL SURGERY

## 2022-11-30 PROCEDURE — 99214 OFFICE O/P EST MOD 30 MIN: CPT | Mod: S$PBB,,, | Performed by: COLON & RECTAL SURGERY

## 2022-11-30 PROCEDURE — 3008F PR BODY MASS INDEX (BMI) DOCUMENTED: ICD-10-PCS | Mod: CPTII,,, | Performed by: COLON & RECTAL SURGERY

## 2022-11-30 RX ORDER — HYDROXYZINE PAMOATE 25 MG/1
25 CAPSULE ORAL 3 TIMES DAILY
COMMUNITY
Start: 2022-10-27

## 2022-11-30 RX ORDER — CIPROFLOXACIN 500 MG/1
500 TABLET ORAL 2 TIMES DAILY
Qty: 2 TABLET | Refills: 0 | Status: CANCELLED | OUTPATIENT
Start: 2022-11-30

## 2022-11-30 RX ORDER — METRONIDAZOLE 500 MG/1
500 TABLET ORAL 2 TIMES DAILY
Qty: 2 TABLET | Refills: 0 | Status: CANCELLED | OUTPATIENT
Start: 2022-11-30

## 2022-11-30 RX ORDER — OXCARBAZEPINE 150 MG/1
TABLET, FILM COATED ORAL
COMMUNITY
Start: 2021-12-16

## 2022-11-30 NOTE — PROGRESS NOTES
HPI:  Sheila Ortez is a 37 y.o. female with history of rectal prolapse     6-  Redo suture rectopexy.  Minimal blood loss.  Did well after surgery.  D/C postoperative 2.  Spoke to pt by phone POD 7 and was doing well.       Had problems evacuating on POD 9 and strained.  Passed large amt of stool in shower.  No pain. Went to sleep fine.  Then awakened with severe abd pain.  Brought urgently to ED and OR for free air and peritonitis.       6-  exploratory lap, Julianna's  Findings: Patient had a perforation of the rectum at the peritoneal reflection. It was difficult to tell if this was secondary to thermal injury but there was a nearby suture likely from her previous surgery at [facility] 2 weeks ago. There was feculent peritonitis. We mobilized the rectum below the peritoneal reflection divided the rectum and sigmoid colon and provided her with end colostomy      Feculent peritonitis at time of exploration (personal phone communication with Dr Zimmerman MIS surgeon in [facility].  He found a rectal perforation unclear if adjacent to rectopexy.  He performed Julianna's procedure.       Two issues following d/c from hospital.  She reports having problems with BMs - first had problems with one piece appliance.  Better with two piece.  Not having blow outs.    Took ATBs.  No diarrhea since antibioitics (see below).     However stool pasty and now hard to evacuate bag.       Having d/c from rectum, mucous.  Fecal material initially.  Treated with cipro flagyl.  Got better.  Stopped ATBs now with recurrent mucous.  No blood.       Appetite and energy improving.       9- CT scan showed enteritis and proctitis.        To see GI tomorrow.       11-  interval hx:  Gaining wt.  134 lbs (up from 120)  Energy levels good.      Past Medical History:   Diagnosis Date    Chronic diarrhea     Colostomy in place     Fecal incontinence     Morbid obesity     Pancreatic insufficiency     Rectal prolapse           Past Surgical History:   Procedure Laterality Date    ABDOMINAL SURGERY       SECTION      CHOLECYSTECTOMY      COLON SURGERY      GASTROPLASTY DUODENAL SWITCH      PROCTOPEXY N/A 2022    Procedure: OPEN RECTOPEXY;  Surgeon: MIGUEL Wilburn MD;  Location: [facility] OR [address] [place] [address];  Service: Colon and Rectal;  Laterality: N/A;       Review of patient's allergies indicates:   Allergen Reactions    Cephalosporins Anaphylaxis     Other reaction(s): Infection  Pt can take penicillin      Metoclopramide Other (See Comments)     Other reaction(s): Infection  Involuntary muscle spasms         No family history on file.    Social History     Socioeconomic History    Marital status:    Tobacco Use    Smoking status: Every Day     Packs/day: 0.50     Types: Cigarettes    Smokeless tobacco: Never   Substance and Sexual Activity    Alcohol use: Not Currently       ROS:  GENERAL: No fever, chills, fatigability or weight loss.  Integument: No rashes, redness, icterus  CHEST: Denies ORTIZ, cyanosis, wheezing, cough and sputum production.  CARDIOVASCULAR: Denies chest pain, PND, orthopnea or reduced exercise tolerance.  GI: Denies abd pain, dysphagia, nausea, vomiting, no hematemesis   : Denies burning on urination, no hematuria, no bacteriuria  MSK: No deformities, swelling, joint pain swelling  Neurologic: No HAs, seizures, weakness, paresthesias, gait problems    PE:  General appearance well  Sclera/ Skin anicteric  LN none palpable  AT NC EOMI  Neck supple trachea midline   Chest symmetric, nl excursion, no retractions, breathing comfortably  Abdomen stoma LLQ.  Midline wound healed  ND soft NT.  no masses, no organomegaly  EXT - no CCE  Neuro:  Mood/ affect nl, alert and oriented x 3, moves all ext's, gait nl      Assessment:   Colostomy end following emergent Julianna's procedure for perforated rectum following rectopexy  Malnutrition improved.    History pancreatic insufficiency    Plan:  Discussed  colostomy closure, likely need for LAR  Functional consequences and possible fecal incontinence discussed explicitly.    Expected hospital course, recuperation, and risks of bleeding, infection, anastomotic leak, reoperation/ intervention and risk of injury to the ureter discussed.

## 2022-11-30 NOTE — H&P (VIEW-ONLY)
HPI:  Sheila Ortez is a 37 y.o. female with history of rectal prolapse     6-  Redo suture rectopexy.  Minimal blood loss.  Did well after surgery.  D/C postoperative 2.  Spoke to pt by phone POD 7 and was doing well.       Had problems evacuating on POD 9 and strained.  Passed large amt of stool in shower.  No pain. Went to sleep fine.  Then awakened with severe abd pain.  Brought urgently to ED and OR for free air and peritonitis.       6-  exploratory lap, Julianna's  Findings: Patient had a perforation of the rectum at the peritoneal reflection. It was difficult to tell if this was secondary to thermal injury but there was a nearby suture likely from her previous surgery at [facility] 2 weeks ago. There was feculent peritonitis. We mobilized the rectum below the peritoneal reflection divided the rectum and sigmoid colon and provided her with end colostomy      Feculent peritonitis at time of exploration (personal phone communication with Dr Zimmerman MIS surgeon in [facility].  He found a rectal perforation unclear if adjacent to rectopexy.  He performed Julianna's procedure.       Two issues following d/c from hospital.  She reports having problems with BMs - first had problems with one piece appliance.  Better with two piece.  Not having blow outs.    Took ATBs.  No diarrhea since antibioitics (see below).     However stool pasty and now hard to evacuate bag.       Having d/c from rectum, mucous.  Fecal material initially.  Treated with cipro flagyl.  Got better.  Stopped ATBs now with recurrent mucous.  No blood.       Appetite and energy improving.       9- CT scan showed enteritis and proctitis.        To see GI tomorrow.       11-  interval hx:  Gaining wt.  134 lbs (up from 120)  Energy levels good.      Past Medical History:   Diagnosis Date    Chronic diarrhea     Colostomy in place     Fecal incontinence     Morbid obesity     Pancreatic insufficiency     Rectal prolapse           Past Surgical History:   Procedure Laterality Date    ABDOMINAL SURGERY       SECTION      CHOLECYSTECTOMY      COLON SURGERY      GASTROPLASTY DUODENAL SWITCH      PROCTOPEXY N/A 2022    Procedure: OPEN RECTOPEXY;  Surgeon: MIGUEL Wilburn MD;  Location: [facility] OR [address] [place] [address];  Service: Colon and Rectal;  Laterality: N/A;       Review of patient's allergies indicates:   Allergen Reactions    Cephalosporins Anaphylaxis     Other reaction(s): Infection  Pt can take penicillin      Metoclopramide Other (See Comments)     Other reaction(s): Infection  Involuntary muscle spasms         No family history on file.    Social History     Socioeconomic History    Marital status:    Tobacco Use    Smoking status: Every Day     Packs/day: 0.50     Types: Cigarettes    Smokeless tobacco: Never   Substance and Sexual Activity    Alcohol use: Not Currently       ROS:  GENERAL: No fever, chills, fatigability or weight loss.  Integument: No rashes, redness, icterus  CHEST: Denies ORTIZ, cyanosis, wheezing, cough and sputum production.  CARDIOVASCULAR: Denies chest pain, PND, orthopnea or reduced exercise tolerance.  GI: Denies abd pain, dysphagia, nausea, vomiting, no hematemesis   : Denies burning on urination, no hematuria, no bacteriuria  MSK: No deformities, swelling, joint pain swelling  Neurologic: No HAs, seizures, weakness, paresthesias, gait problems    PE:  General appearance well  Sclera/ Skin anicteric  LN none palpable  AT NC EOMI  Neck supple trachea midline   Chest symmetric, nl excursion, no retractions, breathing comfortably  Abdomen stoma LLQ.  Midline wound healed  ND soft NT.  no masses, no organomegaly  EXT - no CCE  Neuro:  Mood/ affect nl, alert and oriented x 3, moves all ext's, gait nl      Assessment:   Colostomy end following emergent Julianna's procedure for perforated rectum following rectopexy  Malnutrition improved.    History pancreatic insufficiency    Plan:  Discussed  colostomy closure, likely need for LAR  Functional consequences and possible fecal incontinence discussed explicitly.    Expected hospital course, recuperation, and risks of bleeding, infection, anastomotic leak, reoperation/ intervention and risk of injury to the ureter discussed.

## 2022-12-01 RX ORDER — CIPROFLOXACIN 500 MG/1
500 TABLET ORAL 2 TIMES DAILY
COMMUNITY
Start: 2022-09-22

## 2022-12-01 NOTE — PRE-PROCEDURE INSTRUCTIONS
Preoperative NPO and Bowel Prep instructions given per CRS Dept. Patient's questions answered and patient V/C/U.    PREOP INSTRUCTIONS:    Shower instructions as well as directions to the Surgery Center were given.Encouraged to wear loose fitting,comfortable clothing.Medication instructions for pm prior to and am of procedure reviewed.Instructed to avoid taking vitamins,supplements,aspirin and ibuprofen the morning of surgery.     Patient advised of the updated visitor policy allowing one adult support person,pre and post procedure.     Patient denies any side effects or issues with anesthesia or sedation.      Patient given 0500 arrival time to [facility]

## 2022-12-02 ENCOUNTER — ANESTHESIA (OUTPATIENT)
Dept: SURGERY | Facility: HOSPITAL | Age: 37
End: 2022-12-02
Payer: MEDICAID

## 2022-12-02 ENCOUNTER — HOSPITAL ENCOUNTER (INPATIENT)
Facility: HOSPITAL | Age: 37
LOS: 2 days | Discharge: HOME | End: 2022-12-04
Attending: COLON & RECTAL SURGERY | Admitting: COLON & RECTAL SURGERY
Payer: MEDICAID

## 2022-12-02 ENCOUNTER — ANESTHESIA EVENT (OUTPATIENT)
Dept: SURGERY | Facility: HOSPITAL | Age: 37
End: 2022-12-02
Payer: MEDICAID

## 2022-12-02 DIAGNOSIS — Z93.2 ILEOSTOMY IN PLACE: ICD-10-CM

## 2022-12-02 DIAGNOSIS — Z93.3 COLOSTOMY IN PLACE: Primary | ICD-10-CM

## 2022-12-02 LAB
B-HCG UR QL: NEGATIVE
CTP QC/QA: YES

## 2022-12-02 PROCEDURE — 63600175 PHARM REV CODE 636 W HCPCS: Performed by: ANESTHESIOLOGY

## 2022-12-02 PROCEDURE — 45541 CORRECT RECTAL PROLAPSE: CPT | Mod: 51,,, | Performed by: COLON & RECTAL SURGERY

## 2022-12-02 PROCEDURE — 88302 TISSUE EXAM BY PATHOLOGIST: CPT | Performed by: STUDENT IN AN ORGANIZED HEALTH CARE EDUCATION/TRAINING PROGRAM

## 2022-12-02 PROCEDURE — 25000003 PHARM REV CODE 250: Performed by: NURSE PRACTITIONER

## 2022-12-02 PROCEDURE — 25000003 PHARM REV CODE 250: Performed by: STUDENT IN AN ORGANIZED HEALTH CARE EDUCATION/TRAINING PROGRAM

## 2022-12-02 PROCEDURE — 63600175 PHARM REV CODE 636 W HCPCS: Performed by: NURSE PRACTITIONER

## 2022-12-02 PROCEDURE — 45541 PR FIX RECTAL PROLAPSE,PERINEAL APPR: ICD-10-PCS | Mod: 51,,, | Performed by: COLON & RECTAL SURGERY

## 2022-12-02 PROCEDURE — 36000709 HC OR TIME LEV III EA ADD 15 MIN: Performed by: COLON & RECTAL SURGERY

## 2022-12-02 PROCEDURE — 44626 PR CLOSE ENTEROSTOMY,RESEC+COLOREC ANAS: ICD-10-PCS | Mod: ,,, | Performed by: COLON & RECTAL SURGERY

## 2022-12-02 PROCEDURE — 88307 TISSUE EXAM BY PATHOLOGIST: CPT | Mod: 59 | Performed by: STUDENT IN AN ORGANIZED HEALTH CARE EDUCATION/TRAINING PROGRAM

## 2022-12-02 PROCEDURE — 88307 PR  SURG PATH,LEVEL V: ICD-10-PCS | Mod: 26,,, | Performed by: STUDENT IN AN ORGANIZED HEALTH CARE EDUCATION/TRAINING PROGRAM

## 2022-12-02 PROCEDURE — D9220A PRA ANESTHESIA: Mod: ANES,,, | Performed by: ANESTHESIOLOGY

## 2022-12-02 PROCEDURE — 71000033 HC RECOVERY, INTIAL HOUR: Performed by: COLON & RECTAL SURGERY

## 2022-12-02 PROCEDURE — 63600175 PHARM REV CODE 636 W HCPCS: Performed by: STUDENT IN AN ORGANIZED HEALTH CARE EDUCATION/TRAINING PROGRAM

## 2022-12-02 PROCEDURE — 63600175 PHARM REV CODE 636 W HCPCS: Performed by: COLON & RECTAL SURGERY

## 2022-12-02 PROCEDURE — 27201423 OPTIME MED/SURG SUP & DEVICES STERILE SUPPLY: Performed by: COLON & RECTAL SURGERY

## 2022-12-02 PROCEDURE — 36000708 HC OR TIME LEV III 1ST 15 MIN: Performed by: COLON & RECTAL SURGERY

## 2022-12-02 PROCEDURE — 37000009 HC ANESTHESIA EA ADD 15 MINS: Performed by: COLON & RECTAL SURGERY

## 2022-12-02 PROCEDURE — 20600001 HC STEP DOWN PRIVATE ROOM

## 2022-12-02 PROCEDURE — 94799 UNLISTED PULMONARY SVC/PX: CPT

## 2022-12-02 PROCEDURE — 71000015 HC POSTOP RECOV 1ST HR: Performed by: COLON & RECTAL SURGERY

## 2022-12-02 PROCEDURE — 94761 N-INVAS EAR/PLS OXIMETRY MLT: CPT

## 2022-12-02 PROCEDURE — 25000003 PHARM REV CODE 250: Performed by: COLON & RECTAL SURGERY

## 2022-12-02 PROCEDURE — D9220A PRA ANESTHESIA: Mod: CRNA,,, | Performed by: STUDENT IN AN ORGANIZED HEALTH CARE EDUCATION/TRAINING PROGRAM

## 2022-12-02 PROCEDURE — D9220A PRA ANESTHESIA: ICD-10-PCS | Mod: ANES,,, | Performed by: ANESTHESIOLOGY

## 2022-12-02 PROCEDURE — 88302 PR  SURG PATH,LEVEL II: ICD-10-PCS | Mod: 26,,, | Performed by: STUDENT IN AN ORGANIZED HEALTH CARE EDUCATION/TRAINING PROGRAM

## 2022-12-02 PROCEDURE — 71000016 HC POSTOP RECOV ADDL HR: Performed by: COLON & RECTAL SURGERY

## 2022-12-02 PROCEDURE — 88307 TISSUE EXAM BY PATHOLOGIST: CPT | Mod: 26,,, | Performed by: STUDENT IN AN ORGANIZED HEALTH CARE EDUCATION/TRAINING PROGRAM

## 2022-12-02 PROCEDURE — 88302 TISSUE EXAM BY PATHOLOGIST: CPT | Mod: 26,,, | Performed by: STUDENT IN AN ORGANIZED HEALTH CARE EDUCATION/TRAINING PROGRAM

## 2022-12-02 PROCEDURE — 99900035 HC TECH TIME PER 15 MIN (STAT)

## 2022-12-02 PROCEDURE — 44626 REPAIR BOWEL OPENING: CPT | Mod: ,,, | Performed by: COLON & RECTAL SURGERY

## 2022-12-02 PROCEDURE — D9220A PRA ANESTHESIA: ICD-10-PCS | Mod: CRNA,,, | Performed by: STUDENT IN AN ORGANIZED HEALTH CARE EDUCATION/TRAINING PROGRAM

## 2022-12-02 PROCEDURE — 81025 URINE PREGNANCY TEST: CPT | Performed by: COLON & RECTAL SURGERY

## 2022-12-02 PROCEDURE — S0030 INJECTION, METRONIDAZOLE: HCPCS | Performed by: NURSE PRACTITIONER

## 2022-12-02 PROCEDURE — 37000008 HC ANESTHESIA 1ST 15 MINUTES: Performed by: COLON & RECTAL SURGERY

## 2022-12-02 PROCEDURE — 25000003 PHARM REV CODE 250: Performed by: ANESTHESIOLOGY

## 2022-12-02 RX ORDER — PANTOPRAZOLE SODIUM 40 MG/1
40 TABLET, DELAYED RELEASE ORAL DAILY
Status: DISCONTINUED | OUTPATIENT
Start: 2022-12-02 | End: 2022-12-04 | Stop reason: HOSPADM

## 2022-12-02 RX ORDER — OXCARBAZEPINE 150 MG/1
300 TABLET, FILM COATED ORAL 2 TIMES DAILY
Status: DISCONTINUED | OUTPATIENT
Start: 2022-12-02 | End: 2022-12-04 | Stop reason: HOSPADM

## 2022-12-02 RX ORDER — PROCHLORPERAZINE EDISYLATE 5 MG/ML
5 INJECTION INTRAMUSCULAR; INTRAVENOUS EVERY 6 HOURS PRN
Status: DISCONTINUED | OUTPATIENT
Start: 2022-12-02 | End: 2022-12-04 | Stop reason: HOSPADM

## 2022-12-02 RX ORDER — HYDROMORPHONE HCL IN 0.9% NACL 6 MG/30 ML
PATIENT CONTROLLED ANALGESIA SYRINGE INTRAVENOUS CONTINUOUS
Status: DISCONTINUED | OUTPATIENT
Start: 2022-12-02 | End: 2022-12-03

## 2022-12-02 RX ORDER — GABAPENTIN 300 MG/1
300 CAPSULE ORAL
Status: COMPLETED | OUTPATIENT
Start: 2022-12-02 | End: 2022-12-02

## 2022-12-02 RX ORDER — IBUPROFEN 400 MG/1
800 TABLET ORAL EVERY 8 HOURS
Status: DISCONTINUED | OUTPATIENT
Start: 2022-12-03 | End: 2022-12-04 | Stop reason: HOSPADM

## 2022-12-02 RX ORDER — ENOXAPARIN SODIUM 100 MG/ML
40 INJECTION SUBCUTANEOUS EVERY 24 HOURS
Status: DISCONTINUED | OUTPATIENT
Start: 2022-12-03 | End: 2022-12-04 | Stop reason: HOSPADM

## 2022-12-02 RX ORDER — TRAMADOL HYDROCHLORIDE 50 MG/1
50 TABLET ORAL EVERY 6 HOURS PRN
Status: DISCONTINUED | OUTPATIENT
Start: 2022-12-02 | End: 2022-12-04 | Stop reason: HOSPADM

## 2022-12-02 RX ORDER — MIDAZOLAM HYDROCHLORIDE 1 MG/ML
INJECTION INTRAMUSCULAR; INTRAVENOUS
Status: DISPENSED
Start: 2022-12-02 | End: 2022-12-02

## 2022-12-02 RX ORDER — ONDANSETRON 2 MG/ML
4 INJECTION INTRAMUSCULAR; INTRAVENOUS EVERY 6 HOURS PRN
Status: DISCONTINUED | OUTPATIENT
Start: 2022-12-02 | End: 2022-12-04 | Stop reason: HOSPADM

## 2022-12-02 RX ORDER — GABAPENTIN 300 MG/1
300 CAPSULE ORAL 3 TIMES DAILY
Status: DISCONTINUED | OUTPATIENT
Start: 2022-12-02 | End: 2022-12-02

## 2022-12-02 RX ORDER — TRIPROLIDINE/PSEUDOEPHEDRINE 2.5MG-60MG
600 TABLET ORAL
Status: COMPLETED | OUTPATIENT
Start: 2022-12-02 | End: 2022-12-02

## 2022-12-02 RX ORDER — ONDANSETRON 2 MG/ML
INJECTION INTRAMUSCULAR; INTRAVENOUS
Status: DISCONTINUED | OUTPATIENT
Start: 2022-12-02 | End: 2022-12-02

## 2022-12-02 RX ORDER — HYDROMORPHONE HYDROCHLORIDE 1 MG/ML
0.2 INJECTION, SOLUTION INTRAMUSCULAR; INTRAVENOUS; SUBCUTANEOUS EVERY 5 MIN PRN
Status: DISCONTINUED | OUTPATIENT
Start: 2022-12-02 | End: 2022-12-02 | Stop reason: HOSPADM

## 2022-12-02 RX ORDER — OXCARBAZEPINE 150 MG/1
150 TABLET, FILM COATED ORAL 2 TIMES DAILY
Status: DISCONTINUED | OUTPATIENT
Start: 2022-12-02 | End: 2022-12-02

## 2022-12-02 RX ORDER — METRONIDAZOLE 500 MG/100ML
500 INJECTION, SOLUTION INTRAVENOUS
Status: COMPLETED | OUTPATIENT
Start: 2022-12-02 | End: 2022-12-02

## 2022-12-02 RX ORDER — ALVIMOPAN 12 MG/1
12 CAPSULE ORAL 2 TIMES DAILY
Status: DISCONTINUED | OUTPATIENT
Start: 2022-12-02 | End: 2022-12-02

## 2022-12-02 RX ORDER — LIDOCAINE HYDROCHLORIDE 20 MG/ML
INJECTION, SOLUTION EPIDURAL; INFILTRATION; INTRACAUDAL; PERINEURAL
Status: DISCONTINUED | OUTPATIENT
Start: 2022-12-02 | End: 2022-12-02

## 2022-12-02 RX ORDER — GABAPENTIN 300 MG/1
300 CAPSULE ORAL 3 TIMES DAILY
Status: DISCONTINUED | OUTPATIENT
Start: 2022-12-02 | End: 2022-12-04 | Stop reason: HOSPADM

## 2022-12-02 RX ORDER — HEPARIN SODIUM 5000 [USP'U]/ML
5000 INJECTION, SOLUTION INTRAVENOUS; SUBCUTANEOUS EVERY 8 HOURS
Status: COMPLETED | OUTPATIENT
Start: 2022-12-02 | End: 2022-12-02

## 2022-12-02 RX ORDER — HYDROMORPHONE HCL IN 0.9% NACL 6 MG/30 ML
PATIENT CONTROLLED ANALGESIA SYRINGE INTRAVENOUS
Status: DISPENSED
Start: 2022-12-02 | End: 2022-12-03

## 2022-12-02 RX ORDER — ACETAMINOPHEN 650 MG/20.3ML
975 LIQUID ORAL
Status: COMPLETED | OUTPATIENT
Start: 2022-12-02 | End: 2022-12-02

## 2022-12-02 RX ORDER — SODIUM CHLORIDE 9 MG/ML
INJECTION, SOLUTION INTRAVENOUS CONTINUOUS
Status: DISCONTINUED | OUTPATIENT
Start: 2022-12-02 | End: 2022-12-03

## 2022-12-02 RX ORDER — ACETAMINOPHEN 500 MG
1000 TABLET ORAL EVERY 8 HOURS
Status: DISCONTINUED | OUTPATIENT
Start: 2022-12-03 | End: 2022-12-04 | Stop reason: HOSPADM

## 2022-12-02 RX ORDER — NALOXONE HCL 0.4 MG/ML
0.02 VIAL (ML) INJECTION
Status: DISCONTINUED | OUTPATIENT
Start: 2022-12-02 | End: 2022-12-03

## 2022-12-02 RX ORDER — ACETAMINOPHEN 10 MG/ML
1000 INJECTION, SOLUTION INTRAVENOUS EVERY 8 HOURS
Status: COMPLETED | OUTPATIENT
Start: 2022-12-02 | End: 2022-12-03

## 2022-12-02 RX ORDER — SODIUM CHLORIDE 0.9 % (FLUSH) 0.9 %
3 SYRINGE (ML) INJECTION
Status: DISCONTINUED | OUTPATIENT
Start: 2022-12-02 | End: 2022-12-02

## 2022-12-02 RX ORDER — HALOPERIDOL 5 MG/ML
0.5 INJECTION INTRAMUSCULAR EVERY 10 MIN PRN
Status: DISCONTINUED | OUTPATIENT
Start: 2022-12-02 | End: 2022-12-02 | Stop reason: HOSPADM

## 2022-12-02 RX ORDER — DIPHENHYDRAMINE HYDROCHLORIDE 50 MG/ML
12.5 INJECTION INTRAMUSCULAR; INTRAVENOUS EVERY 6 HOURS PRN
Status: DISCONTINUED | OUTPATIENT
Start: 2022-12-02 | End: 2022-12-04 | Stop reason: HOSPADM

## 2022-12-02 RX ORDER — SODIUM CHLORIDE 0.9 % (FLUSH) 0.9 %
10 SYRINGE (ML) INJECTION
Status: DISCONTINUED | OUTPATIENT
Start: 2022-12-02 | End: 2022-12-04 | Stop reason: HOSPADM

## 2022-12-02 RX ORDER — EPHEDRINE SULFATE 50 MG/ML
INJECTION, SOLUTION INTRAVENOUS
Status: DISCONTINUED | OUTPATIENT
Start: 2022-12-02 | End: 2022-12-02

## 2022-12-02 RX ORDER — DEXAMETHASONE SODIUM PHOSPHATE 4 MG/ML
INJECTION, SOLUTION INTRA-ARTICULAR; INTRALESIONAL; INTRAMUSCULAR; INTRAVENOUS; SOFT TISSUE
Status: DISCONTINUED | OUTPATIENT
Start: 2022-12-02 | End: 2022-12-02

## 2022-12-02 RX ORDER — FENTANYL CITRATE 50 UG/ML
INJECTION, SOLUTION INTRAMUSCULAR; INTRAVENOUS
Status: DISCONTINUED | OUTPATIENT
Start: 2022-12-02 | End: 2022-12-02

## 2022-12-02 RX ORDER — HYDROXYZINE PAMOATE 25 MG/1
25 CAPSULE ORAL 3 TIMES DAILY
Status: DISCONTINUED | OUTPATIENT
Start: 2022-12-02 | End: 2022-12-04 | Stop reason: HOSPADM

## 2022-12-02 RX ORDER — BUPIVACAINE HYDROCHLORIDE 2.5 MG/ML
INJECTION, SOLUTION EPIDURAL; INFILTRATION; INTRACAUDAL
Status: DISCONTINUED | OUTPATIENT
Start: 2022-12-02 | End: 2022-12-02 | Stop reason: HOSPADM

## 2022-12-02 RX ORDER — MUPIROCIN 20 MG/G
1 OINTMENT TOPICAL
Status: COMPLETED | OUTPATIENT
Start: 2022-12-02 | End: 2022-12-02

## 2022-12-02 RX ORDER — DEXMEDETOMIDINE HYDROCHLORIDE 100 UG/ML
INJECTION, SOLUTION INTRAVENOUS
Status: DISCONTINUED | OUTPATIENT
Start: 2022-12-02 | End: 2022-12-02

## 2022-12-02 RX ORDER — TIZANIDINE 2 MG/1
2 TABLET ORAL EVERY 8 HOURS PRN
Status: DISCONTINUED | OUTPATIENT
Start: 2022-12-02 | End: 2022-12-04 | Stop reason: HOSPADM

## 2022-12-02 RX ORDER — OXYCODONE AND ACETAMINOPHEN 5; 325 MG/1; MG/1
1 TABLET ORAL
Status: DISCONTINUED | OUTPATIENT
Start: 2022-12-02 | End: 2022-12-02

## 2022-12-02 RX ORDER — OXYCODONE HYDROCHLORIDE 10 MG/1
10 TABLET ORAL EVERY 4 HOURS PRN
Status: DISCONTINUED | OUTPATIENT
Start: 2022-12-02 | End: 2022-12-02

## 2022-12-02 RX ORDER — PHENYLEPHRINE HCL IN 0.9% NACL 1 MG/10 ML
SYRINGE (ML) INTRAVENOUS
Status: DISCONTINUED | OUTPATIENT
Start: 2022-12-02 | End: 2022-12-02

## 2022-12-02 RX ORDER — PROPOFOL 10 MG/ML
VIAL (ML) INTRAVENOUS
Status: DISCONTINUED | OUTPATIENT
Start: 2022-12-02 | End: 2022-12-02

## 2022-12-02 RX ORDER — HYDROMORPHONE HCL IN 0.9% NACL 6 MG/30 ML
PATIENT CONTROLLED ANALGESIA SYRINGE INTRAVENOUS CONTINUOUS
Status: DISCONTINUED | OUTPATIENT
Start: 2022-12-02 | End: 2022-12-02

## 2022-12-02 RX ORDER — LIDOCAINE HYDROCHLORIDE 10 MG/ML
1 INJECTION, SOLUTION EPIDURAL; INFILTRATION; INTRACAUDAL; PERINEURAL
Status: COMPLETED | OUTPATIENT
Start: 2022-12-02 | End: 2022-12-02

## 2022-12-02 RX ORDER — OXYCODONE HYDROCHLORIDE 5 MG/1
5 TABLET ORAL EVERY 6 HOURS PRN
Status: DISCONTINUED | OUTPATIENT
Start: 2022-12-02 | End: 2022-12-02

## 2022-12-02 RX ORDER — SODIUM CHLORIDE 9 MG/ML
INJECTION, SOLUTION INTRAVENOUS
Status: COMPLETED | OUTPATIENT
Start: 2022-12-02 | End: 2022-12-02

## 2022-12-02 RX ORDER — SODIUM CHLORIDE 9 MG/ML
INJECTION, SOLUTION INTRAVENOUS CONTINUOUS
Status: DISCONTINUED | OUTPATIENT
Start: 2022-12-02 | End: 2022-12-02

## 2022-12-02 RX ORDER — MIDAZOLAM HYDROCHLORIDE 5 MG/ML
INJECTION INTRAMUSCULAR; INTRAVENOUS
Status: DISCONTINUED | OUTPATIENT
Start: 2022-12-02 | End: 2022-12-02

## 2022-12-02 RX ORDER — MUPIROCIN 20 MG/G
OINTMENT TOPICAL 2 TIMES DAILY
Status: DISCONTINUED | OUTPATIENT
Start: 2022-12-02 | End: 2022-12-04 | Stop reason: HOSPADM

## 2022-12-02 RX ORDER — ROCURONIUM BROMIDE 10 MG/ML
INJECTION, SOLUTION INTRAVENOUS
Status: DISCONTINUED | OUTPATIENT
Start: 2022-12-02 | End: 2022-12-02

## 2022-12-02 RX ORDER — MIDAZOLAM HYDROCHLORIDE 1 MG/ML
2 INJECTION INTRAMUSCULAR; INTRAVENOUS ONCE
Status: DISCONTINUED | OUTPATIENT
Start: 2022-12-02 | End: 2022-12-04 | Stop reason: HOSPADM

## 2022-12-02 RX ORDER — ONDANSETRON 2 MG/ML
4 INJECTION INTRAMUSCULAR; INTRAVENOUS DAILY PRN
Status: DISCONTINUED | OUTPATIENT
Start: 2022-12-02 | End: 2022-12-02 | Stop reason: HOSPADM

## 2022-12-02 RX ORDER — NEOSTIGMINE METHYLSULFATE 0.5 MG/ML
INJECTION, SOLUTION INTRAVENOUS
Status: DISCONTINUED | OUTPATIENT
Start: 2022-12-02 | End: 2022-12-02

## 2022-12-02 RX ADMIN — Medication 100 MCG: at 07:12

## 2022-12-02 RX ADMIN — GABAPENTIN 300 MG: 300 CAPSULE ORAL at 02:12

## 2022-12-02 RX ADMIN — HYDROXYZINE PAMOATE 25 MG: 25 CAPSULE ORAL at 02:12

## 2022-12-02 RX ADMIN — MIDAZOLAM HYDROCHLORIDE 2 MG: 5 INJECTION, SOLUTION INTRAMUSCULAR; INTRAVENOUS at 07:12

## 2022-12-02 RX ADMIN — SODIUM CHLORIDE: 0.9 INJECTION, SOLUTION INTRAVENOUS at 06:12

## 2022-12-02 RX ADMIN — Medication: at 12:12

## 2022-12-02 RX ADMIN — OXCARBAZEPINE 300 MG: 150 TABLET, FILM COATED ORAL at 09:12

## 2022-12-02 RX ADMIN — GLYCOPYRROLATE 0.4 MG: 0.2 INJECTION INTRAMUSCULAR; INTRAVENOUS at 10:12

## 2022-12-02 RX ADMIN — EPHEDRINE SULFATE 5 MG: 50 INJECTION INTRAVENOUS at 10:12

## 2022-12-02 RX ADMIN — HEPARIN SODIUM 5000 UNITS: 5000 INJECTION INTRAVENOUS; SUBCUTANEOUS at 06:12

## 2022-12-02 RX ADMIN — SODIUM CHLORIDE, SODIUM GLUCONATE, SODIUM ACETATE, POTASSIUM CHLORIDE, MAGNESIUM CHLORIDE, SODIUM PHOSPHATE, DIBASIC, AND POTASSIUM PHOSPHATE: .53; .5; .37; .037; .03; .012; .00082 INJECTION, SOLUTION INTRAVENOUS at 07:12

## 2022-12-02 RX ADMIN — HYDROMORPHONE HYDROCHLORIDE 0.2 MG: 1 INJECTION, SOLUTION INTRAMUSCULAR; INTRAVENOUS; SUBCUTANEOUS at 11:12

## 2022-12-02 RX ADMIN — HYDROMORPHONE HYDROCHLORIDE 0.2 MG: 1 INJECTION, SOLUTION INTRAMUSCULAR; INTRAVENOUS; SUBCUTANEOUS at 12:12

## 2022-12-02 RX ADMIN — ROCURONIUM BROMIDE 10 MG: 10 INJECTION INTRAVENOUS at 09:12

## 2022-12-02 RX ADMIN — FENTANYL CITRATE 25 MCG: 50 INJECTION INTRAMUSCULAR; INTRAVENOUS at 10:12

## 2022-12-02 RX ADMIN — ACETAMINOPHEN 1000 MG: 10 INJECTION INTRAVENOUS at 09:12

## 2022-12-02 RX ADMIN — EPHEDRINE SULFATE 5 MG: 50 INJECTION INTRAVENOUS at 07:12

## 2022-12-02 RX ADMIN — IBUPROFEN 600 MG: 100 SUSPENSION ORAL at 06:12

## 2022-12-02 RX ADMIN — METRONIDAZOLE 500 MG: 5 INJECTION, SOLUTION INTRAVENOUS at 06:12

## 2022-12-02 RX ADMIN — ONDANSETRON 4 MG: 2 INJECTION INTRAMUSCULAR; INTRAVENOUS at 10:12

## 2022-12-02 RX ADMIN — FENTANYL CITRATE 50 MCG: 50 INJECTION INTRAMUSCULAR; INTRAVENOUS at 07:12

## 2022-12-02 RX ADMIN — DEXMEDETOMIDINE HYDROCHLORIDE 8 MCG: 100 INJECTION, SOLUTION INTRAVENOUS at 10:12

## 2022-12-02 RX ADMIN — LIDOCAINE HYDROCHLORIDE 100 MG: 20 INJECTION, SOLUTION EPIDURAL; INFILTRATION; INTRACAUDAL; PERINEURAL at 07:12

## 2022-12-02 RX ADMIN — OXYCODONE HYDROCHLORIDE AND ACETAMINOPHEN 1 TABLET: 5; 325 TABLET ORAL at 11:12

## 2022-12-02 RX ADMIN — LIDOCAINE HYDROCHLORIDE 10 MG: 10 INJECTION, SOLUTION EPIDURAL; INFILTRATION; INTRACAUDAL; PERINEURAL at 06:12

## 2022-12-02 RX ADMIN — ACETAMINOPHEN 976.6 MG: 650 SOLUTION ORAL at 06:12

## 2022-12-02 RX ADMIN — GENTAMICIN SULFATE 291 MG: 40 INJECTION, SOLUTION INTRAMUSCULAR; INTRAVENOUS at 07:12

## 2022-12-02 RX ADMIN — GABAPENTIN 300 MG: 300 CAPSULE ORAL at 06:12

## 2022-12-02 RX ADMIN — MUPIROCIN 1 G: 20 OINTMENT TOPICAL at 06:12

## 2022-12-02 RX ADMIN — GABAPENTIN 300 MG: 300 CAPSULE ORAL at 09:12

## 2022-12-02 RX ADMIN — DEXAMETHASONE SODIUM PHOSPHATE 4 MG: 4 INJECTION INTRA-ARTICULAR; INTRALESIONAL; INTRAMUSCULAR; INTRAVENOUS; SOFT TISSUE at 07:12

## 2022-12-02 RX ADMIN — ROCURONIUM BROMIDE 10 MG: 10 INJECTION INTRAVENOUS at 08:12

## 2022-12-02 RX ADMIN — ROCURONIUM BROMIDE 40 MG: 10 INJECTION INTRAVENOUS at 07:12

## 2022-12-02 RX ADMIN — HYDROXYZINE PAMOATE 25 MG: 25 CAPSULE ORAL at 09:12

## 2022-12-02 RX ADMIN — MUPIROCIN: 20 OINTMENT TOPICAL at 09:12

## 2022-12-02 RX ADMIN — NEOSTIGMINE METHYLSULFATE 3 MG: 0.5 INJECTION, SOLUTION INTRAVENOUS at 10:12

## 2022-12-02 RX ADMIN — PROPOFOL 110 MG: 10 INJECTION, EMULSION INTRAVENOUS at 07:12

## 2022-12-02 RX ADMIN — ACETAMINOPHEN 1000 MG: 10 INJECTION INTRAVENOUS at 02:12

## 2022-12-02 RX ADMIN — SODIUM CHLORIDE: 0.9 INJECTION, SOLUTION INTRAVENOUS at 10:12

## 2022-12-02 RX ADMIN — Medication: at 07:12

## 2022-12-02 NOTE — INTERVAL H&P NOTE
The patient has been examined and the H&P has been reviewed:    I concur with the findings and no changes have occurred since H&P was written.    Surgery risks, benefits and alternative options discussed and understood by patient. Consent previously signed in clinic, antibiotics on call to OR. Will proceed to OR for Julianna's takedown (end colostomy closure with partial resection of colon and rectum - low anterior resection).    Guero Morales MD  Colon and Rectal Surgery Fellow    There are no hospital problems to display for this patient.

## 2022-12-02 NOTE — TRANSFER OF CARE
"Anesthesia Transfer of Care Note    Patient: Sheila Ortez    Procedure(s) Performed: Procedure(s) (LRB):  CLOSURE, COLOSTOMY (Left)  RESECTION, COLON, LOW ANTERIOR (Left)  REPAIR, HERNIA, PARASTOMAL (Left)  SIGMOIDOSCOPY, FLEXIBLE (N/A)  RECTOPEXY (N/A)    Patient location: PACU    Anesthesia Type: general    Transport from OR: Transported from OR on 6-10 L/min O2 by face mask with adequate spontaneous ventilation    Post pain: adequate analgesia    Post assessment: no apparent anesthetic complications    Post vital signs: stable    Level of consciousness: responds to stimulation    Nausea/Vomiting: no nausea/vomiting    Complications: none    Transfer of care protocol was followed      Last vitals:   Visit Vitals  BP (!) 99/57 (BP Location: Right arm, Patient Position: Lying)   Pulse 71   Temp 36.4 °C (97.5 °F) (Temporal)   Resp 20   Ht 5' 5" (1.651 m)   Wt 58.2 kg (128 lb 4.9 oz)   SpO2 100%   Breastfeeding No   BMI 21.35 kg/m²     "

## 2022-12-02 NOTE — PROGRESS NOTES
Pt belongings obtained from [facility] lockers. Large black bag of belongings, purse and pillow. Pillow put under head.

## 2022-12-02 NOTE — ANESTHESIA POSTPROCEDURE EVALUATION
Anesthesia Post Evaluation    Patient: Sheila Ortez    Procedure(s) Performed: Procedure(s) (LRB):  CLOSURE, COLOSTOMY (Left)  RESECTION, COLON, LOW ANTERIOR (Left)  REPAIR, HERNIA, PARASTOMAL (Left)  SIGMOIDOSCOPY, FLEXIBLE (N/A)  RECTOPEXY (N/A)    Final Anesthesia Type: general      Patient location during evaluation: PACU  Patient participation: Yes- Able to Participate  Level of consciousness: awake  Post-procedure vital signs: reviewed and stable  Pain management: adequate  Airway patency: patent    PONV status at discharge: No PONV  Anesthetic complications: no      Cardiovascular status: stable  Respiratory status: unassisted, spontaneous ventilation and room air  Hydration status: euvolemic  Follow-up not needed.          Vitals Value Taken Time   BP 95/50 12/02/22 1149   Temp 36.4 °C (97.5 °F) 12/02/22 1045   Pulse 66 12/02/22 1151   Resp 33 12/02/22 1151   SpO2 100 % 12/02/22 1151   Vitals shown include unvalidated device data.      No case tracking events are documented in the log.      Pain/Michael Score: Pain Rating Prior to Med Admin: 8 (12/2/2022 11:45 AM)  Michael Score: 8 (12/2/2022 11:00 AM)

## 2022-12-02 NOTE — ANESTHESIA PROCEDURE NOTES
Intubation    Date/Time: 12/2/2022 7:12 AM  Performed by: Mei Klein CRNA  Authorized by: Tigre Grullon Jr., MD     Intubation:     Induction:  Intravenous    Intubated:  Postinduction    Mask Ventilation:  Easy mask    Attempts:  1    Attempted By:  CRNA    Method of Intubation:  Direct    Blade:  Diane 2    Laryngeal View Grade: Grade I - full view of cords      Difficult Airway Encountered?: No      Complications:  None    Airway Device:  Oral endotracheal tube    Airway Device Size:  7.0    Style/Cuff Inflation:  Cuffed    Inflation Amount (mL):  7    Tube secured:  21    Secured at:  The lips    Placement Verified By:  Capnometry    Complicating Factors:  None    Findings Post-Intubation:  BS equal bilateral and atraumatic/condition of teeth unchanged

## 2022-12-02 NOTE — PROGRESS NOTES
Pt received from Mo RN, pt awake and complains of pain at abd. 9/10, incision CDI.  Vs stable.  Oral and iv pain med given

## 2022-12-02 NOTE — PROGRESS NOTES
Dr. Wilburn at bedside.  Made aware that pt's pain level is 9/10 and that she stated taking approx (3) 10/325 percocets on avg per day.  States he will have resident order pca.

## 2022-12-02 NOTE — NURSING TRANSFER
Nursing Transfer Note      12/2/2022     Reason patient is being transferred: recovery care complete    Transfer To: 1044 a    Transfer via bed    Transfer with personal bag x 2       Transported by transporters    Medicines sent: dilaudid pca    Any special needs or follow-up needed:no    Chart send with patient: No    Notified: spouse    Patient reassessed at: 2070 12/2/22

## 2022-12-02 NOTE — OP NOTE
Date of procedure:   December 2, 2022    Indications for procedure:  38 yo female with history of Julianna's procedure for perforated rectum 9 days following suture rectopexy.  The patient has recovered well from her emergency surgery for perforated viscus now six months after.  She is brought to the OR for closure of colostomy, possible low anterior resection.      Preoperative diagnosis:   colostomy, Julianna's procedure for perforated rectum    Postoperative diagnosis:  same, rectal prolapse, parastomal hernia    Name of procedure:  exploratory laparotomy, low anterior resection, colostomy closure, rectopexy, flexible sigmoidoscopy, repair of parastomal hernia    Surgeon:   MIGUEL Wilburn MD    Assistant surgeon:  Guero Morales MD    Type of anesthesia:  GETA    EBL:  200  Cc's    Drains:  none    Specimen:     Portion of rectum  Portion of colon  Anastomotic donuts  Hernia sac    Findings:   Filmy adhesions  Residual rectum with lax endopelvic fascia  Colorectal anastomosis following low anterior resection, anastomosis at 9-10 cm from anal verge.  Air leak test negative  Suture rectopexy wtin 2.0 Ethibond  Parastomal hernia    Technique in detail:  The patient was brought to the operating room positively identified and placed on the operating table in the supine position with sequential compression devices on her lower extremities.  The patient had undergone an outpatient oral mechanical and oral antibiotic bowel preparation.  She received intravenous antibiotics.  She underwent general endotracheal anesthesia without complication.  Fregoso catheter and orogastric tube were inserted.  The patient was positioned in the modified lithotomy position and padded Yellofin stirrups.  Her left arm was padded and tucked at her side.  Stoma appliance was removed.  Her abdomen and perineum were prepared and draped in usual fashion.    A critical time-out was performed.    The stoma site was identified and quarantine with  blue towels.  The mucocutaneous junction was incised with a 15. Scalpel.  The end of the stoma was carefully sharply dissected away from the anterior abdominal wall into the peritoneal cavity.  The end of the stoma was closed with 2-0 Vicryl suture inverting the mucosa.  Betadine was placed over the end of the stoma in the wound in the abdominal wall was prepared with Betadine and we changed gloves and used clean instruments.    The patient was explored through a midline incision.  This extended from the symphysis pubis to just above the umbilicus.  Dissection was carried down through the linea alba sharply using the knife and the abdomen was entered with care being taken to avoid injury to the underlying viscera.  Filmy adhesions were encountered of the small bowel to the anterior abdominal wall into the pelvis.  These were sharply dissected and the small bowel was delivered out of the was and the lower abdomen.  The stoma was dissected from the anterior abdominal wall into the peritoneal cavity.  A self retaining retractor was employed and the small bowel and the colon were packed into the upper abdomen.    We directed our attention to identifying the rectal stump, Julianna's pouch.  There were noted to be pelvic adhesions of the gynecologic viscera to the residual rectum.  A lighted deep pelvic retractors were employed for exposure.  Headlight illumination was also employed.  The ovaries and the uterus were dissected from the rectal stump.  The uterus was suspended out of the abdomen for exposure.  We placed intestinal sizers into the rectum and advanced this to the top of the residual rectum for purposes of visualization of the rectal muscular tube.  This had retracted below the level of sacral promontory.  We mobilized the rectum and the mesorectum posteriorly and laterally down to the level of the levator muscles.  Anteriorly the rectum was dissected away from the posterior aspect of the uterus and a deep  cul-de-sac with care being taken to avoid injury to the vagina.  In anticipation of anastomosis we resected the upper aspect of the rectum using a Tx 60 stapling device with a green cartridge.  The specimen was sent to Surgical pathology.  The mesorectum to this portion of bowel resected was divided with the Bovie electrocautery hemostasis was assured.     I then mobilized the descending colon along the white line of [place] dissecting the small bowel away from the mesentery of the descending colon.  The end of bowel easily reached into the pelvis for tension-free anastomosis.  We resected approximately 5 cm of the end of the descending colon in anticipation of anastomosis such that 2 fresh ends of bowel would be used for construction of the anastomosis.  The mesentery to the portion of bowel to be resected was divided and the marginal artery of [place] was allowed to bleed freely.  Pulsatile arterial blood flow was noted confirming excellent perfusion of the end of the conduit for anastomosis.  A pursestring clamp was placed across the bowel proximally and a crushing clamp distally.  Monosof suture on a Cole needle was used to fashion the pursestring.  The bowel was transected with a knife and spent to Surgical pathology.   Twenty-nine CDH stapler was brought onto the field and the anvil was placed into the bowel proximally in the pursestring suture was securely tied down.      The assistant surgeon went to the perineum.  He dilated the anus to 2 fingerbreadths and passed intestinal sizers to the top of the residual rectum.  The residual rectum measure approximately 9-10 cm.  Ultimately the stapler was advanced to the top of the residual rectum and the spike was extruded just anterior to the staple line.  The descending colon was brought into proximity with care being taken to properly orient the mesentery.  The instrument was mated, cinched down and compression occurred for approximately 60 seconds prior to firing  the stapler.  Two complete anastomotic donuts were recovered and sent to Surgical pathology.  Flexible sigmoidoscopy was performed which revealed excellent perfusion of both the rectal stump and the conduit proximally with an intact hemostatic anastomosis.  Air insufflation was performed and no bubbling was seen indicating a negative air leak test.  The bowel was deflated and the colonoscope was removed.  Suture rectopexy was then performed fixing the lateral aspect of the mesorectum to the midline sacrum just below the level of sacral promontory.  There was no tension on the anastomosis and again excellent blood supply was noted to both portions of bowel.    Hemostasis was assured.  The small bowel was returned to its anatomic position.  Transversus abdominis plane block was performed.  We changed gown and gloves.  Clean instruments were used to close the abdomen.  We 1st addressed a large peristomal hernia with excision of the hernia sac.  The posterior sheath and peritoneum was approximated with continuous 0 Vicryl suture.  The anterior rectus sheath at the stoma site was repaired with interrupted figure-of-eight sutures of 1 Vicryl.  The midline incision was then approximated using continuous PDS suture, 1 looped.  Subcutaneous tissues were irrigated saline solution.  The skin was repaired with subcuticular Monocryl and Dermabond.  The stoma site was loosely approximated with an encircling suture of 0 Vicryl.  Dry gauze dressing was applied over the stoma site.    The patient tolerated the procedure well and there were no complications noted.  The patient was returned to the supine position, awakened from anesthesia and extubated without incident.  She was transported to the recovery area in stable condition.

## 2022-12-02 NOTE — ANESTHESIA PREPROCEDURE EVALUATION
12/02/2022  Sheila Ortez is a 37 y.o., female.      Procedure: CLOSURE, ILEOSTOMY, OPEN (Abdomen)   Anesthesia type: General   Diagnosis: Ileostomy in place [Z93.2]       Got colostomy after rectal prolapse surgery with rectal tear.  Pre-operative evaluation for Procedure(s) (LRB):  CLOSURE, ILEOSTOMY, OPEN (N/A)    @[id number]    Encounter Diagnosis   Name Primary?    Ileostomy in place        Review of patient's allergies indicates:   Allergen Reactions    Cephalosporins Anaphylaxis     Other reaction(s): Infection  Pt can take penicillin      Metoclopramide Other (See Comments)     Other reaction(s): Infection  Involuntary muscle spasms         Medications Prior to Admission   Medication Sig Dispense Refill Last Dose    cariprazine (VRAYLAR) 3 mg Cap   TAKE ONE CAPSULE BY MOUTH EVERY DAY THANK YOU   12/2/2022 at 0300    ciprofloxacin HCl (CIPRO) 500 MG tablet Take 500 mg by mouth 2 (two) times daily.   12/1/2022 at 2300    CREON 24,000-76,000 -120,000 unit capsule Take 2 capsules by mouth 3 (three) times daily.   12/1/2022 at 1700    hydrOXYzine pamoate (VISTARIL) 25 MG Cap Take 25 mg by mouth 3 (three) times daily.   12/2/2022 at 0300    metroNIDAZOLE (FLAGYL) 500 MG tablet Take 1 tablet (500 mg total) by mouth 3 (three) times daily. Take one tablet at 1pm, 1 tablet at 2pm & 1 tablet at 11pm. 3 tablet 0 12/1/2022 at 2300    OXcarbazepine (TRILEPTAL) 150 MG Tab Take 150 mg by mouth 2 (two) times daily.   12/2/2022 at 0300    oxyCODONE-acetaminophen (PERCOCET)  mg per tablet   1 tab, Oral, q6h, PRN for pain, 0 Refill(s)   12/2/2022 at 0300    tiZANidine (ZANAFLEX) 4 MG tablet 4 mg.   12/1/2022 at 2000    traZODone (DESYREL) 100 MG tablet 100 mg.   Past Week    gabapentin (NEURONTIN) 300 MG capsule Take 1 capsule (300 mg total) by mouth 3 (three) times daily. (Patient not taking:  "Reported on 11/30/2022) 90 capsule 11     OXcarbazepine (TRILEPTAL) 150 MG Tab TAKE 1 TABLET BY MOUTH TWICE A DAY "GERSON CHENG"       oxyCODONE (ROXICODONE) 5 MG immediate release tablet Take 1 tablet (5 mg total) by mouth every 6 (six) hours as needed for Pain. (Patient not taking: Reported on 11/30/2022) 28 tablet 0     pantoprazole (PROTONIX) 40 MG tablet 40 mg.   More than a month    UNABLE TO FIND 325 mg.               Current Facility-Administered Medications on File Prior to Encounter   Medication Dose Route Frequency Provider Last Rate Last Admin    0.9%  NaCl infusion   Intravenous Continuous Annel Cruz NP         Current Outpatient Medications on File Prior to Encounter   Medication Sig Dispense Refill    cariprazine (VRAYLAR) 3 mg Cap   TAKE ONE CAPSULE BY MOUTH EVERY DAY THANK YOU      ciprofloxacin HCl (CIPRO) 500 MG tablet Take 500 mg by mouth 2 (two) times daily.      CREON 24,000-76,000 -120,000 unit capsule Take 2 capsules by mouth 3 (three) times daily.      metroNIDAZOLE (FLAGYL) 500 MG tablet Take 1 tablet (500 mg total) by mouth 3 (three) times daily. Take one tablet at 1pm, 1 tablet at 2pm & 1 tablet at 11pm. 3 tablet 0    OXcarbazepine (TRILEPTAL) 150 MG Tab Take 150 mg by mouth 2 (two) times daily.      oxyCODONE-acetaminophen (PERCOCET)  mg per tablet   1 tab, Oral, q6h, PRN for pain, 0 Refill(s)      tiZANidine (ZANAFLEX) 4 MG tablet 4 mg.      traZODone (DESYREL) 100 MG tablet 100 mg.      gabapentin (NEURONTIN) 300 MG capsule Take 1 capsule (300 mg total) by mouth 3 (three) times daily. (Patient not taking: Reported on 11/30/2022) 90 capsule 11    oxyCODONE (ROXICODONE) 5 MG immediate release tablet Take 1 tablet (5 mg total) by mouth every 6 (six) hours as needed for Pain. (Patient not taking: Reported on 11/30/2022) 28 tablet 0    pantoprazole (PROTONIX) 40 MG tablet 40 mg.      UNABLE TO FIND 325 mg.         Past Medical History:  No date: Chronic " diarrhea  No date: Colostomy in place  No date: Fecal incontinence  No date: Morbid obesity  No date: Pancreatic insufficiency  No date: Rectal prolapse    Past Surgical History:   Procedure Laterality Date    ABDOMINAL SURGERY       SECTION      CHOLECYSTECTOMY      COLON SURGERY      GASTROPLASTY DUODENAL SWITCH      PROCTOPEXY N/A 2022    Procedure: OPEN RECTOPEXY;  Surgeon: MIGUEL Wilburn MD;  Location: [facility] OR [address] [place] [address];  Service: Colon and Rectal;  Laterality: N/A;       Social History     Tobacco Use   Smoking Status Every Day    Packs/day: 0.50    Types: Cigarettes   Smokeless Tobacco Never       Social History     Substance and Sexual Activity   Alcohol Use Not Currently       Physical Activity: Not on file         Recent Labs     22  1114   HCT 29.8*     Recent Labs     22  1114   *     Recent Labs     22  1114   K 3.7     Recent Labs     22  1114   CREATININE 0.6     Recent Labs     22  1114   GLU 94     No results for input(s): PT in the last 72 hours.                      Pre-op Assessment          Review of Systems  Anesthesia Hx:  No problems with previous Anesthesia    Hematology/Oncology:  Hematology Normal   Oncology Normal     Cardiovascular:   Denies Hypertension.  Denies MI.    Denies Angina.    Pulmonary:  Pulmonary Normal  Denies COPD.  Denies Asthma.  Denies Shortness of breath.    Renal/:   Denies Chronic Renal Disease.     Hepatic/GI:   Denies Liver Disease.    Neurological:   Denies TIA. Denies CVA. Denies Seizures.    Endocrine:   Denies Diabetes.           Anesthesia Plan  Type of Anesthesia, risks & benefits discussed:    Anesthesia Type: Gen ETT  Intra-op Monitoring Plan: Standard ASA Monitors  Post Op Pain Control Plan: multimodal analgesia and IV/PO Opioids PRN  Induction:  IV  Informed Consent: Informed consent signed with the Patient and all parties understand the risks and agree with anesthesia plan.  All questions  answered.   ASA Score: 2  Day of Surgery Review of History & Physical: H&P Update referred to the surgeon/provider.    Ready For Surgery From Anesthesia Perspective.     . Date/Time: 6/17/2022 7:11 AM  Performed by: Jeff Singleton DO  Authorized by: Luis Antonio Chang MD      Intubation:     Induction:  Intravenous    Intubated:  Postinduction    Mask Ventilation:  Easy mask    Attempts:  1    Attempted By:  Resident anesthesiologist    Method of Intubation:  Direct    Blade:  Diane 2    Laryngeal View Grade: Grade I - full view of cords      Difficult Airway Encountered?: No      Complications:  None    Airway Device:  Oral endotracheal tube    Airway Device Size:  7.0    Style/Cuff Inflation:  Cuffed (inflated to minimal occlusive pressure)    Placement Verified By:  Capnometry    Complicating Factors:  None    Findings Post-Intubation:  BS equal bilateral and atraumatic/condition of teeth unchanged

## 2022-12-03 LAB
ANION GAP SERPL CALC-SCNC: 5 MMOL/L (ref 8–16)
BASOPHILS # BLD AUTO: 0.04 K/UL (ref 0–0.2)
BASOPHILS NFR BLD: 0.4 % (ref 0–1.9)
BUN SERPL-MCNC: 5 MG/DL (ref 6–20)
CALCIUM SERPL-MCNC: 7.9 MG/DL (ref 8.7–10.5)
CHLORIDE SERPL-SCNC: 105 MMOL/L (ref 95–110)
CO2 SERPL-SCNC: 22 MMOL/L (ref 23–29)
CREAT SERPL-MCNC: 0.6 MG/DL (ref 0.5–1.4)
DIFFERENTIAL METHOD: ABNORMAL
EOSINOPHIL # BLD AUTO: 0.4 K/UL (ref 0–0.5)
EOSINOPHIL NFR BLD: 4.1 % (ref 0–8)
ERYTHROCYTE [DISTWIDTH] IN BLOOD BY AUTOMATED COUNT: 17.5 % (ref 11.5–14.5)
EST. GFR  (NO RACE VARIABLE): >60 ML/MIN/1.73 M^2
GLUCOSE SERPL-MCNC: 103 MG/DL (ref 70–110)
HCT VFR BLD AUTO: 28.9 % (ref 37–48.5)
HGB BLD-MCNC: 8.5 G/DL (ref 12–16)
IMM GRANULOCYTES # BLD AUTO: 0.02 K/UL (ref 0–0.04)
IMM GRANULOCYTES NFR BLD AUTO: 0.2 % (ref 0–0.5)
LYMPHOCYTES # BLD AUTO: 1.2 K/UL (ref 1–4.8)
LYMPHOCYTES NFR BLD: 13.4 % (ref 18–48)
MAGNESIUM SERPL-MCNC: 1.8 MG/DL (ref 1.6–2.6)
MCH RBC QN AUTO: 24.2 PG (ref 27–31)
MCHC RBC AUTO-ENTMCNC: 29.4 G/DL (ref 32–36)
MCV RBC AUTO: 82 FL (ref 82–98)
MONOCYTES # BLD AUTO: 0.9 K/UL (ref 0.3–1)
MONOCYTES NFR BLD: 9.5 % (ref 4–15)
NEUTROPHILS # BLD AUTO: 6.5 K/UL (ref 1.8–7.7)
NEUTROPHILS NFR BLD: 72.4 % (ref 38–73)
NRBC BLD-RTO: 0 /100 WBC
PHOSPHATE SERPL-MCNC: 2.5 MG/DL (ref 2.7–4.5)
PLATELET # BLD AUTO: 479 K/UL (ref 150–450)
PMV BLD AUTO: 10.1 FL (ref 9.2–12.9)
POTASSIUM SERPL-SCNC: 3.7 MMOL/L (ref 3.5–5.1)
RBC # BLD AUTO: 3.51 M/UL (ref 4–5.4)
SODIUM SERPL-SCNC: 132 MMOL/L (ref 136–145)
WBC # BLD AUTO: 9.01 K/UL (ref 3.9–12.7)

## 2022-12-03 PROCEDURE — 25000003 PHARM REV CODE 250: Performed by: STUDENT IN AN ORGANIZED HEALTH CARE EDUCATION/TRAINING PROGRAM

## 2022-12-03 PROCEDURE — 63600175 PHARM REV CODE 636 W HCPCS: Performed by: STUDENT IN AN ORGANIZED HEALTH CARE EDUCATION/TRAINING PROGRAM

## 2022-12-03 PROCEDURE — 83735 ASSAY OF MAGNESIUM: CPT | Performed by: STUDENT IN AN ORGANIZED HEALTH CARE EDUCATION/TRAINING PROGRAM

## 2022-12-03 PROCEDURE — 84100 ASSAY OF PHOSPHORUS: CPT | Performed by: STUDENT IN AN ORGANIZED HEALTH CARE EDUCATION/TRAINING PROGRAM

## 2022-12-03 PROCEDURE — 20600001 HC STEP DOWN PRIVATE ROOM

## 2022-12-03 PROCEDURE — 63600175 PHARM REV CODE 636 W HCPCS: Performed by: COLON & RECTAL SURGERY

## 2022-12-03 PROCEDURE — 85025 COMPLETE CBC W/AUTO DIFF WBC: CPT | Performed by: STUDENT IN AN ORGANIZED HEALTH CARE EDUCATION/TRAINING PROGRAM

## 2022-12-03 PROCEDURE — 36415 COLL VENOUS BLD VENIPUNCTURE: CPT | Performed by: STUDENT IN AN ORGANIZED HEALTH CARE EDUCATION/TRAINING PROGRAM

## 2022-12-03 PROCEDURE — 80048 BASIC METABOLIC PNL TOTAL CA: CPT | Performed by: STUDENT IN AN ORGANIZED HEALTH CARE EDUCATION/TRAINING PROGRAM

## 2022-12-03 RX ORDER — MORPHINE SULFATE 2 MG/ML
2 INJECTION, SOLUTION INTRAMUSCULAR; INTRAVENOUS EVERY 4 HOURS PRN
Status: DISCONTINUED | OUTPATIENT
Start: 2022-12-03 | End: 2022-12-04 | Stop reason: HOSPADM

## 2022-12-03 RX ORDER — OXYCODONE HYDROCHLORIDE 10 MG/1
10 TABLET ORAL EVERY 4 HOURS PRN
Status: DISCONTINUED | OUTPATIENT
Start: 2022-12-03 | End: 2022-12-04 | Stop reason: HOSPADM

## 2022-12-03 RX ADMIN — IBUPROFEN 800 MG: 400 TABLET, FILM COATED ORAL at 03:12

## 2022-12-03 RX ADMIN — HYDROXYZINE PAMOATE 25 MG: 25 CAPSULE ORAL at 08:12

## 2022-12-03 RX ADMIN — IBUPROFEN 800 MG: 800 INJECTION INTRAVENOUS at 01:12

## 2022-12-03 RX ADMIN — HYDROXYZINE PAMOATE 25 MG: 25 CAPSULE ORAL at 03:12

## 2022-12-03 RX ADMIN — MORPHINE SULFATE 2 MG: 2 INJECTION, SOLUTION INTRAMUSCULAR; INTRAVENOUS at 03:12

## 2022-12-03 RX ADMIN — IBUPROFEN 800 MG: 400 TABLET, FILM COATED ORAL at 09:12

## 2022-12-03 RX ADMIN — OXYCODONE HYDROCHLORIDE 15 MG: 10 TABLET ORAL at 05:12

## 2022-12-03 RX ADMIN — IBUPROFEN 800 MG: 800 INJECTION INTRAVENOUS at 07:12

## 2022-12-03 RX ADMIN — ONDANSETRON 4 MG: 2 INJECTION INTRAMUSCULAR; INTRAVENOUS at 12:12

## 2022-12-03 RX ADMIN — GABAPENTIN 300 MG: 300 CAPSULE ORAL at 08:12

## 2022-12-03 RX ADMIN — MUPIROCIN: 20 OINTMENT TOPICAL at 08:12

## 2022-12-03 RX ADMIN — OXYCODONE HYDROCHLORIDE 15 MG: 10 TABLET ORAL at 10:12

## 2022-12-03 RX ADMIN — GABAPENTIN 300 MG: 300 CAPSULE ORAL at 09:12

## 2022-12-03 RX ADMIN — OXYCODONE HYDROCHLORIDE 15 MG: 10 TABLET ORAL at 12:12

## 2022-12-03 RX ADMIN — OXCARBAZEPINE 300 MG: 150 TABLET, FILM COATED ORAL at 11:12

## 2022-12-03 RX ADMIN — MORPHINE SULFATE 2 MG: 2 INJECTION, SOLUTION INTRAMUSCULAR; INTRAVENOUS at 07:12

## 2022-12-03 RX ADMIN — ACETAMINOPHEN 1000 MG: 500 TABLET ORAL at 09:12

## 2022-12-03 RX ADMIN — PANTOPRAZOLE SODIUM 40 MG: 40 TABLET, DELAYED RELEASE ORAL at 09:12

## 2022-12-03 RX ADMIN — Medication: at 04:12

## 2022-12-03 RX ADMIN — ENOXAPARIN SODIUM 40 MG: 40 INJECTION SUBCUTANEOUS at 05:12

## 2022-12-03 RX ADMIN — MUPIROCIN: 20 OINTMENT TOPICAL at 09:12

## 2022-12-03 RX ADMIN — HYDROXYZINE PAMOATE 25 MG: 25 CAPSULE ORAL at 09:12

## 2022-12-03 RX ADMIN — ACETAMINOPHEN 1000 MG: 10 INJECTION INTRAVENOUS at 06:12

## 2022-12-03 RX ADMIN — GABAPENTIN 300 MG: 300 CAPSULE ORAL at 03:12

## 2022-12-03 RX ADMIN — CARIPRAZINE 3 MG: 1.5 CAPSULE, GELATIN COATED ORAL at 10:12

## 2022-12-03 RX ADMIN — OXCARBAZEPINE 300 MG: 150 TABLET, FILM COATED ORAL at 09:12

## 2022-12-03 RX ADMIN — ACETAMINOPHEN 1000 MG: 500 TABLET ORAL at 03:12

## 2022-12-03 NOTE — PROGRESS NOTES
COLON AND RECTAL SURGERY    HPI  36 yo F w/ rectal prolapse s/p rectopexy x 2 (redo was 6/17) who required Julianna's on 6/27 for feculent peritonitis s/p Julianna's takedown, LAR, rectopexy, parastomal hernia repair on 12/2    SUBJ  Doing well this AM, had some flatus, ambulating in leon.  Pain well controlled. Anxious to resume diet.    OBJ  Vitals:    12/03/22 1138   BP: (!) 106/59   Pulse: 71   Resp: 18   Temp: 97.8 °F (36.6 °C)      Physical Exam  Constitutional:       Appearance: She is well-developed.   HENT:      Head: Normocephalic and atraumatic.   Eyes:      General:         Right eye: No discharge.         Left eye: No discharge.   Cardiovascular:      Rate and Rhythm: Normal rate and regular rhythm.   Pulmonary:      Effort: Pulmonary effort is normal. No respiratory distress.   Abdominal:      Comments: Soft, minimally distended, incisions c/d/I  Ostomy pursestring, not packed, very minimal drainage   Musculoskeletal:         General: Normal range of motion.      Cervical back: Normal range of motion and neck supple.   Skin:     General: Skin is warm and dry.   Neurological:      Mental Status: She is alert and oriented to person, place, and time.   Psychiatric:         Behavior: Behavior normal.       Recent Labs   Lab 12/03/22  0431   WBC 9.01   RBC 3.51*   HGB 8.5*   HCT 28.9*   *   MCV 82   MCH 24.2*   MCHC 29.4*      Recent Labs   Lab 12/03/22  0431   *   K 3.7      CO2 22*   BUN 5*   CREATININE 0.6   MG 1.8      Imaging- None since admit    A+P  36 yo F s/p Julianna's takedown, LAR, rectopexy, parastomal hernia repair on 12/2    -Overall doing well  -Reg diet  -d/c PCA, oral meds, boost  -Chemical DVT ppx    Oh Pro MD  General Surgery, PGY-5  [phone]

## 2022-12-03 NOTE — RESPIRATORY THERAPY
RAPID RESPONSE RESPIRATORY THERAPY ETCO2 CHECK         Time of visit: 922     Code Status: No Order   : 1985  Bed: 1044/1044 A:   MRN: [id number]  Time spent at the bedside: < 15 min    SITUATION    Evaluated patient for: ETCo2 compliance    BACKGROUND    Why is the patient in the hospital?: Colostomy in place    Patient has a past medical history of Chronic diarrhea, Colostomy in place, Fecal incontinence, Morbid obesity, Pancreatic insufficiency, and Rectal prolapse.    24 Hours Vitals Range:  Temp:  [97.4 °F (36.3 °C)-98.3 °F (36.8 °C)]   Pulse:  [62-76]   Resp:  [14-20]   BP: ()/(51-59)   SpO2:  [94 %-100 %]     Labs:    Recent Labs     22  0431   *   K 3.7      CO2 22*   CREATININE 0.6      PHOS 2.5*   MG 1.8        No results for input(s): PH, PCO2, PO2, HCO3, POCSATURATED, BE in the last 72 hours.    ASSESSMENT/INTERVENTIONS      Last VS   Temp: 98.3 °F (36.8 °C) (1538)  Pulse: 73 (1538)  Resp: 18 (1538)  BP: 94/54 (1538)  SpO2: 100 % (1538)    Level of Consciousness: Level of Consciousness (AVPU): alert  Respiratory Effort: Respiratory Effort: Normal, Unlabored Expansion/Accessory Muscle Usage: Expansion/Accessory Muscles/Retractions: expansion symmetric, no retractions, no use of accessory muscles  All Lung Field Breath Sounds: All Lung Fields Breath Sounds: Anterior:, Lateral:, clear, equal bilaterally  Is the ETCO2 monitor on? Yes  Is the patient wearing a cannula? Yes  Are ETCO2 orders placed? Yes  Is the patient on a PCA pump? Yes  ETCO2 monitored: ETCO2 (mmHg): 39 mmHg  Ambu at bedside: Ambu bag with the patient?: Yes, Adult Ambu    Active Orders   Respiratory Care    Incentive spirometry     Frequency: Q4H     Number of Occurrences: Until Specified     Order Comments: Q4 while awake until discharge.  Educate patient in use; Keep incentive spirometer within reach; and Document incentive spirometer volume every 4 hours while  awake.    ((10 sets per hour (3-5 inspirations per set)) on original order)         RECOMMENDATIONS    We recommend: RRT Recs: Continue POC per primary team.      FOLLOW-UP    Please call back the Rapid Response RT, Kely Escobar, RRT at x [phone] for any questions or concerns.

## 2022-12-03 NOTE — PLAN OF CARE
POC reviewed. AAOx4, able to assist in her care. VSS on with no complaints of SOB, headaches, or dizziness. Ambulated the halls often day and evening shift. Urine output to BR adequate. Clear liquids diet with mass quantities of apple juice consumed. Eager to have diet advanced. No BM and no complaints of N/V. Pain controlled with scheduled and prn pain medications. Resting with side rails up and call light within reach. Continuing to monitor patient status.

## 2022-12-04 VITALS
HEART RATE: 79 BPM | RESPIRATION RATE: 16 BRPM | TEMPERATURE: 97 F | WEIGHT: 128.31 LBS | SYSTOLIC BLOOD PRESSURE: 104 MMHG | DIASTOLIC BLOOD PRESSURE: 59 MMHG | BODY MASS INDEX: 21.38 KG/M2 | HEIGHT: 65 IN | OXYGEN SATURATION: 100 %

## 2022-12-04 LAB
ANION GAP SERPL CALC-SCNC: 6 MMOL/L (ref 8–16)
BASOPHILS # BLD AUTO: 0.04 K/UL (ref 0–0.2)
BASOPHILS NFR BLD: 0.4 % (ref 0–1.9)
BUN SERPL-MCNC: 5 MG/DL (ref 6–20)
CALCIUM SERPL-MCNC: 7.8 MG/DL (ref 8.7–10.5)
CHLORIDE SERPL-SCNC: 109 MMOL/L (ref 95–110)
CO2 SERPL-SCNC: 24 MMOL/L (ref 23–29)
CREAT SERPL-MCNC: 0.7 MG/DL (ref 0.5–1.4)
DIFFERENTIAL METHOD: ABNORMAL
EOSINOPHIL # BLD AUTO: 0.6 K/UL (ref 0–0.5)
EOSINOPHIL NFR BLD: 6.3 % (ref 0–8)
ERYTHROCYTE [DISTWIDTH] IN BLOOD BY AUTOMATED COUNT: 17.5 % (ref 11.5–14.5)
EST. GFR  (NO RACE VARIABLE): >60 ML/MIN/1.73 M^2
GLUCOSE SERPL-MCNC: 79 MG/DL (ref 70–110)
HCT VFR BLD AUTO: 27 % (ref 37–48.5)
HGB BLD-MCNC: 8 G/DL (ref 12–16)
IMM GRANULOCYTES # BLD AUTO: 0.03 K/UL (ref 0–0.04)
IMM GRANULOCYTES NFR BLD AUTO: 0.3 % (ref 0–0.5)
LYMPHOCYTES # BLD AUTO: 1.3 K/UL (ref 1–4.8)
LYMPHOCYTES NFR BLD: 13 % (ref 18–48)
MCH RBC QN AUTO: 24.2 PG (ref 27–31)
MCHC RBC AUTO-ENTMCNC: 29.6 G/DL (ref 32–36)
MCV RBC AUTO: 82 FL (ref 82–98)
MONOCYTES # BLD AUTO: 1 K/UL (ref 0.3–1)
MONOCYTES NFR BLD: 9.7 % (ref 4–15)
NEUTROPHILS # BLD AUTO: 7 K/UL (ref 1.8–7.7)
NEUTROPHILS NFR BLD: 70.3 % (ref 38–73)
NRBC BLD-RTO: 0 /100 WBC
PLATELET # BLD AUTO: 418 K/UL (ref 150–450)
PMV BLD AUTO: 9.5 FL (ref 9.2–12.9)
POTASSIUM SERPL-SCNC: 4 MMOL/L (ref 3.5–5.1)
RBC # BLD AUTO: 3.3 M/UL (ref 4–5.4)
SODIUM SERPL-SCNC: 139 MMOL/L (ref 136–145)
WBC # BLD AUTO: 9.91 K/UL (ref 3.9–12.7)

## 2022-12-04 PROCEDURE — 94799 UNLISTED PULMONARY SVC/PX: CPT

## 2022-12-04 PROCEDURE — 25000003 PHARM REV CODE 250: Performed by: STUDENT IN AN ORGANIZED HEALTH CARE EDUCATION/TRAINING PROGRAM

## 2022-12-04 PROCEDURE — 36415 COLL VENOUS BLD VENIPUNCTURE: CPT | Performed by: STUDENT IN AN ORGANIZED HEALTH CARE EDUCATION/TRAINING PROGRAM

## 2022-12-04 PROCEDURE — 99900035 HC TECH TIME PER 15 MIN (STAT)

## 2022-12-04 PROCEDURE — 85025 COMPLETE CBC W/AUTO DIFF WBC: CPT | Performed by: STUDENT IN AN ORGANIZED HEALTH CARE EDUCATION/TRAINING PROGRAM

## 2022-12-04 PROCEDURE — 80048 BASIC METABOLIC PNL TOTAL CA: CPT | Performed by: STUDENT IN AN ORGANIZED HEALTH CARE EDUCATION/TRAINING PROGRAM

## 2022-12-04 PROCEDURE — 94761 N-INVAS EAR/PLS OXIMETRY MLT: CPT

## 2022-12-04 PROCEDURE — 63600175 PHARM REV CODE 636 W HCPCS: Performed by: STUDENT IN AN ORGANIZED HEALTH CARE EDUCATION/TRAINING PROGRAM

## 2022-12-04 RX ORDER — OXYCODONE HYDROCHLORIDE 5 MG/1
5 TABLET ORAL EVERY 4 HOURS PRN
Qty: 30 TABLET | Refills: 0 | Status: SHIPPED | OUTPATIENT
Start: 2022-12-04

## 2022-12-04 RX ADMIN — HYDROXYZINE PAMOATE 25 MG: 25 CAPSULE ORAL at 08:12

## 2022-12-04 RX ADMIN — OXYCODONE HYDROCHLORIDE 15 MG: 10 TABLET ORAL at 08:12

## 2022-12-04 RX ADMIN — IBUPROFEN 800 MG: 400 TABLET, FILM COATED ORAL at 05:12

## 2022-12-04 RX ADMIN — ACETAMINOPHEN 1000 MG: 500 TABLET ORAL at 05:12

## 2022-12-04 RX ADMIN — GABAPENTIN 300 MG: 300 CAPSULE ORAL at 08:12

## 2022-12-04 RX ADMIN — CARIPRAZINE 3 MG: 1.5 CAPSULE, GELATIN COATED ORAL at 08:12

## 2022-12-04 RX ADMIN — IBUPROFEN 800 MG: 400 TABLET, FILM COATED ORAL at 01:12

## 2022-12-04 RX ADMIN — MUPIROCIN: 20 OINTMENT TOPICAL at 08:12

## 2022-12-04 RX ADMIN — OXYCODONE HYDROCHLORIDE 10 MG: 10 TABLET ORAL at 01:12

## 2022-12-04 RX ADMIN — OXYCODONE HYDROCHLORIDE 15 MG: 10 TABLET ORAL at 04:12

## 2022-12-04 RX ADMIN — PANTOPRAZOLE SODIUM 40 MG: 40 TABLET, DELAYED RELEASE ORAL at 08:12

## 2022-12-04 RX ADMIN — MORPHINE SULFATE 2 MG: 2 INJECTION, SOLUTION INTRAMUSCULAR; INTRAVENOUS at 12:12

## 2022-12-04 RX ADMIN — OXCARBAZEPINE 300 MG: 150 TABLET, FILM COATED ORAL at 10:12

## 2022-12-04 NOTE — DISCHARGE SUMMARY
[facility]  Colorectal Surgery  Discharge Summary      Patient Name: Sheila Ortez  MRN: [id number]  Admission Date: 12/2/2022  Hospital Length of Stay: 2 days  Discharge Date and Time:  12/04/2022 1:31 PM  Attending Physician: MIGUEL Wilburn MD   Discharging Provider: Oh Pro MD  Primary Care Provider: Primary Doctor No     HPI:  No notes on file  HPI:  Sheila Ortez is a 37 y.o. female with history of rectal prolapse     6-  Redo suture rectopexy.  Minimal blood loss.  Did well after surgery.  D/C postoperative 2.  Spoke to pt by phone POD 7 and was doing well.       Had problems evacuating on POD 9 and strained.  Passed large amt of stool in shower.  No pain. Went to sleep fine.  Then awakened with severe abd pain.  Brought urgently to ED and OR for free air and peritonitis.       6-  exploratory lap, Julianna's  Findings: Patient had a perforation of the rectum at the peritoneal reflection. It was difficult to tell if this was secondary to thermal injury but there was a nearby suture likely from her previous surgery at [facility] 2 weeks ago. There was feculent peritonitis. We mobilized the rectum below the peritoneal reflection divided the rectum and sigmoid colon and provided her with end colostomy        Feculent peritonitis at time of exploration (personal phone communication with Dr Zimmerman MIS surgeon in [facility].  He found a rectal perforation unclear if adjacent to rectopexy.  He performed Julianna's procedure.       Two issues following d/c from hospital.  She reports having problems with BMs - first had problems with one piece appliance.  Better with two piece.  Not having blow outs.    Took ATBs.  No diarrhea since antibioitics (see below).     However stool pasty and now hard to evacuate bag.       Having d/c from rectum, mucous.  Fecal material initially.  Treated with cipro flagyl.  Got better.  Stopped ATBs now with recurrent mucous.  No blood.       Appetite and  energy improving.       9- CT scan showed enteritis and proctitis.        To see GI tomorrow.       11-  interval hx:  Gaining wt.  134 lbs (up from 120)  Energy levels good.    Procedure(s) (LRB):  CLOSURE, COLOSTOMY (Left)  RESECTION, COLON, LOW ANTERIOR (Left)  REPAIR, HERNIA, PARASTOMAL (Left)  SIGMOIDOSCOPY, FLEXIBLE (N/A)  RECTOPEXY (N/A)     Hospital Course:  Patient underwent above procedure, had an uneventful postoperative course and was discharged home on POD#2.  Will plan for outpatient labs tomorrow at [facility].        Goals of Care Treatment Preferences:       Physical exam day of discharge:  Physical Exam  Vitals reviewed.   Constitutional:       Appearance: She is well-developed.   HENT:      Head: Normocephalic and atraumatic.   Eyes:      General:         Right eye: No discharge.         Left eye: No discharge.   Cardiovascular:      Rate and Rhythm: Normal rate and regular rhythm.   Pulmonary:      Effort: Pulmonary effort is normal. No respiratory distress.   Abdominal:      Comments: Soft, non-distended  Incisions c/d/I, appropriately tender  Prior ostomy site w/ minimal drainage   Musculoskeletal:         General: Normal range of motion.      Cervical back: Normal range of motion and neck supple.   Skin:     General: Skin is warm and dry.   Neurological:      Mental Status: She is alert and oriented to person, place, and time.   Psychiatric:         Behavior: Behavior normal.         Significant Diagnostic Studies: Labs:   BMP:   Recent Labs   Lab 12/03/22  0431 12/04/22  1223    79   * 139   K 3.7 4.0    109   CO2 22* 24   BUN 5* 5*   CREATININE 0.6 0.7   CALCIUM 7.9* 7.8*   MG 1.8  --        Pending Diagnostic Studies:     Procedure Component Value Units Date/Time    Specimen to Pathology, Surgery General Surgery [[id number]] Collected: 12/02/22 1025    Order Status: Sent Lab Status: In process Updated: 12/02/22 5220    Specimen: Tissue         Final Active  Diagnoses:    Diagnosis Date Noted POA    PRINCIPAL PROBLEM:  Colostomy in place [Z93.3] 11/30/2022 Not Applicable      Problems Resolved During this Admission:      Discharged Condition: good    Disposition: Home or Self Care    Follow Up:   Follow-up Information     H Samuel Wilburn MD Follow up in 2 week(s).    Specialty: Colon and Rectal Surgery  Why: Post-op  Contact information:  [address]  [facility] [id number]  [phone]                       Patient Instructions:      CBC W/ AUTO DIFFERENTIAL   Standing Status: Future Standing Exp. Date: 02/02/24     BASIC METABOLIC PANEL   Standing Status: Future Standing Exp. Date: 02/02/24     C-REACTIVE PROTEIN   Standing Status: Future Standing Exp. Date: 02/02/24     Diet Adult Regular     Lifting restrictions   Order Comments: No lifting greater than 10 pounds for 6 weeks from day of surgery.  No pushing/pulling such as vacuuming or raking.  No straining, avoid constipation and take stool softeners as described and laxatives as needed.  No driving while on narcotics and until you can react quickly without pain.     No dressing needed   Order Comments: WOUND CARE  You have skin glue over your incision(s)  This will slowly flake away in about 10 days  It is okay to shower starting the day after surgery  Can pat your incision dry  Please do not scrub hard over your incisions  Please do not pick the glue off or it will reopen the wound     Notify your health care provider if you experience any of the following:  temperature >100.4     Notify your health care provider if you experience any of the following:  severe uncontrolled pain     Notify your health care provider if you experience any of the following:  redness, tenderness, or signs of infection (pain, swelling, redness, odor or green/yellow discharge around incision site)     Medications:  Reconciled Home Medications:      Medication List      CHANGE how you take these medications    oxyCODONE 5 MG  "immediate release tablet  Commonly known as: ROXICODONE  Take 1 tablet (5 mg total) by mouth every 4 (four) hours as needed for Pain.  What changed: when to take this        CONTINUE taking these medications    ciprofloxacin HCl 500 MG tablet  Commonly known as: CIPRO  Take 500 mg by mouth 2 (two) times daily.     CREON 24,000-76,000 -120,000 unit capsule  Generic drug: lipase-protease-amylase 24,000-76,000-120,000 units  Take 2 capsules by mouth 3 (three) times daily.     hydrOXYzine pamoate 25 MG Cap  Commonly known as: VISTARIL  Take 25 mg by mouth 3 (three) times daily.     metroNIDAZOLE 500 MG tablet  Commonly known as: FLAGYL  Take 1 tablet (500 mg total) by mouth 3 (three) times daily. Take one tablet at 1pm, 1 tablet at 2pm & 1 tablet at 11pm.     * OXcarbazepine 150 MG Tab  Commonly known as: TRILEPTAL  Take 150 mg by mouth 2 (two) times daily.     * OXcarbazepine 150 MG Tab  Commonly known as: TRILEPTAL  TAKE 1 TABLET BY MOUTH TWICE A DAY "GERSON CHENG"     oxyCODONE-acetaminophen  mg per tablet  Commonly known as: PERCOCET  1 tab, Oral, q6h, PRN for pain, 0 Refill(s)     pantoprazole 40 MG tablet  Commonly known as: PROTONIX  40 mg.     tiZANidine 4 MG tablet  Commonly known as: ZANAFLEX  4 mg.     traZODone 100 MG tablet  Commonly known as: DESYREL  100 mg.     UNABLE TO FIND  325 mg.     VRAYLAR 3 mg Cap  Generic drug: cariprazine  TAKE ONE CAPSULE BY MOUTH EVERY DAY THANK YOU         * This list has 2 medication(s) that are the same as other medications prescribed for you. Read the directions carefully, and ask your doctor or other care provider to review them with you.            STOP taking these medications    gabapentin 300 MG capsule  Commonly known as: TOBIN Pro MD  Colorectal Surgery  [facility]    "

## 2022-12-04 NOTE — NURSING
D/c instructions reviewed with pt. F/u appointment reviewed with pt.  Pt. Verbalized understanding. Medications at bedside with all belongings. Pt declined transportation. Pt. Left with .

## 2022-12-04 NOTE — PLAN OF CARE
POC reviewed w/ pt, verbalized understanding. Pt AAOx4. VSS on RA.     - pain managed with PRN pain meds  -ML incision with DB, C/D/I  - tolerating regular diet. 1episode of nausea relieved with zofran.  - voids per toilet. X2 loose BM    Ambulated in halls frequently. Frequent rounds made for pt safety. Call light in reach and bed in lowest position. Will continue to monitor POC.

## 2022-12-04 NOTE — PLAN OF CARE
Problem: Adult Inpatient Plan of Care  Goal: Plan of Care Review  Outcome: Ongoing, Progressing  Goal: Patient-Specific Goal (Individualized)  Outcome: Ongoing, Progressing  Goal: Absence of Hospital-Acquired Illness or Injury  Outcome: Ongoing, Progressing  Goal: Optimal Comfort and Wellbeing  Outcome: Ongoing, Progressing     Problem: Infection  Goal: Absence of Infection Signs and Symptoms  Outcome: Ongoing, Progressing     Problem: Fall Injury Risk  Goal: Absence of Fall and Fall-Related Injury  Outcome: Ongoing, Progressing     POC reviewed with patient. All questions and concerns addressed. Fall/safety precautions implemented and maintained. Pain management provided. Ambulated the halls of nursing station. No acute events noted this shift. Please see flowsheet for full assessment and vitals. Bed locked in lowest position. Side rails up x2. Call bell within reach. Will continue to monitor.

## 2022-12-04 NOTE — HOSPITAL COURSE
Patient underwent above procedure, had an uneventful postoperative course and was discharged home on POD#2.  Will plan for outpatient labs tomorrow at [facility].

## 2022-12-05 ENCOUNTER — PATIENT OUTREACH (OUTPATIENT)
Dept: ADMINISTRATIVE | Facility: CLINIC | Age: 37
End: 2022-12-05
Payer: MEDICAID

## 2022-12-05 NOTE — PROGRESS NOTES
C3 nurse spoke with Sheila Ortez  for a TCC post hospital discharge follow up call. The patient reports does not have a scheduled HOSFU appointment. C3 nurse was unable to schedule HOSFU appointment for [facility]. Patient advised to contact their PCP to schedule a HOSPFU within 5-7 days.

## 2022-12-07 ENCOUNTER — TELEPHONE (OUTPATIENT)
Dept: SURGERY | Facility: CLINIC | Age: 37
End: 2022-12-07
Payer: MEDICAID

## 2022-12-07 DIAGNOSIS — Z93.3 COLOSTOMY IN PLACE: Primary | ICD-10-CM

## 2022-12-07 NOTE — TELEPHONE ENCOUNTER
Pt said she has been trying to get the orders for her labs faxed to the hospital near her. Told her I would fax them now and should be able to get them within the hour.

## 2022-12-07 NOTE — TELEPHONE ENCOUNTER
----- Message from Estephanie Luis sent at 12/6/2022  4:37 PM CST -----  Type: Needs Medical Advice  Who Called:  Patient  Symptoms (please be specific):    How long has patient had these symptoms:    Pharmacy name and phone #:    Best Call Back Number: [phone]  Additional Information: Patient is requesting a call back from the nurse.

## 2022-12-17 LAB
FINAL PATHOLOGIC DIAGNOSIS: NORMAL
GROSS: NORMAL
Lab: NORMAL
MICROSCOPIC EXAM: NORMAL

## 2023-03-16 ENCOUNTER — TELEPHONE (OUTPATIENT)
Dept: SURGERY | Facility: CLINIC | Age: 38
End: 2023-03-16
Payer: MEDICAID

## 2023-03-16 NOTE — TELEPHONE ENCOUNTER
Spoke with patient, verified email address on file. Emailed and mailed disability letter per Dr Wilburn request.

## 2023-07-14 ENCOUNTER — TELEPHONE (OUTPATIENT)
Dept: SURGERY | Facility: CLINIC | Age: 38
End: 2023-07-14
Payer: MEDICAID

## 2023-07-14 NOTE — TELEPHONE ENCOUNTER
----- Message from Estephanie Lius sent at 7/14/2023 10:30 AM CDT -----  Type: Needs Medical Advice  Who Called:  Patient   Symptoms (please be specific):    How long has patient had these symptoms:    Pharmacy name and phone #:    Best Call Back Number: [phone]  Additional Information: Patient is requesting a call back from Ms. Larose.

## 2023-07-14 NOTE — TELEPHONE ENCOUNTER
Pt is asking for a letter from Dr Wilburn for her disability. The letter has to  specify the extent of her fecal incontinence. Told her Dr Wilburn is out for a couple of months but will give it to him once he returns.

## (undated) DEVICE — SUT CTD VICRYL VIL BR SH 27

## (undated) DEVICE — BOWL STERILE LARGE 32OZ

## (undated) DEVICE — LUBRICANT SURGILUBE 2 OZ

## (undated) DEVICE — SUT 2-0 NYLON D/A

## (undated) DEVICE — TIP YANKAUERS BULB NO VENT

## (undated) DEVICE — SUT 1 8-18IN COATED VICRYLC

## (undated) DEVICE — ADHESIVE DERMABOND ADVANCED

## (undated) DEVICE — NDL BOX COUNTER

## (undated) DEVICE — SEE MEDLINE ITEM 156902

## (undated) DEVICE — SUT 3-0 VICRYL SH CR/8 18

## (undated) DEVICE — SUT VICRYL PLUS 2-0 SH 27IN

## (undated) DEVICE — SUT CTD VICRYL 0 VIL BR/CT

## (undated) DEVICE — ELECTRODE REM PLYHSV RETURN 9

## (undated) DEVICE — STAPLER INTERNL CONTOR CV BLU

## (undated) DEVICE — SUT 1 48IN PDS II VIO MONO

## (undated) DEVICE — SUT CTD VICRYL 2-0 VIL BR

## (undated) DEVICE — DRESSING MEPILEX FLEX 3X3IN

## (undated) DEVICE — TRAY CATH FOL SIL URIMTR 16FR

## (undated) DEVICE — PAD PINK TRENDELENBURG POS XL

## (undated) DEVICE — DRAPE ABDOMINAL TIBURON 14X11

## (undated) DEVICE — SUT VICRYL PLUS 3-0 SH 18IN

## (undated) DEVICE — SEE MEDLINE ITEM 154981

## (undated) DEVICE — CATH URTRL OPEN END STR TIP 5F

## (undated) DEVICE — BELLOW CANN HEMOBLAST 1.65GR

## (undated) DEVICE — SEE MEDLINE ITEM 157144

## (undated) DEVICE — LEGGINGS 48X31 INCH

## (undated) DEVICE — SEE MEDLINE ITEM 157117

## (undated) DEVICE — DRAPE CORETEMP FLD WRM 56X62IN

## (undated) DEVICE — SUT MONOCRYL 4-0 PS-1 UND

## (undated) DEVICE — POUCH SENSURA MIO 3/8X2 1/8IN

## (undated) DEVICE — TRAY FOLEY 16FR INFECTION CONT

## (undated) DEVICE — COVER MAYO STND XL 30X57IN

## (undated) DEVICE — BLADE SURG CARBON STEEL #10

## (undated) DEVICE — SPONGE LAP 18X18 PREWASHED

## (undated) DEVICE — COVER LIGHT HANDLE 80/CA

## (undated) DEVICE — DRESSING ABSRBNT ISLAND 3.6X8

## (undated) DEVICE — DRESSING MEPILEX BORDER 4 X 4

## (undated) DEVICE — SUT CTD VICRYL 0 UND BR CT

## (undated) DEVICE — STAPLER INT PROX TX 60X4.8MM

## (undated) DEVICE — SUT 3/0 27IN PDS II VIO MO

## (undated) DEVICE — SEE MEDLINE ITEM 157181